# Patient Record
Sex: FEMALE | Race: WHITE | Employment: OTHER | URBAN - METROPOLITAN AREA
[De-identification: names, ages, dates, MRNs, and addresses within clinical notes are randomized per-mention and may not be internally consistent; named-entity substitution may affect disease eponyms.]

---

## 2017-01-16 ENCOUNTER — HOSPITAL ENCOUNTER (OUTPATIENT)
Dept: MAMMOGRAPHY | Age: 62
Discharge: HOME | End: 2017-01-16
Attending: OBSTETRICS & GYNECOLOGY
Payer: COMMERCIAL

## 2017-01-16 DIAGNOSIS — Z12.31 VISIT FOR SCREENING MAMMOGRAM: ICD-10-CM

## 2017-01-16 PROCEDURE — 77067 SCR MAMMO BI INCL CAD: CPT

## 2017-03-06 ENCOUNTER — HOSPITAL ENCOUNTER (OUTPATIENT)
Dept: NON INVASIVE DIAGNOSTICS | Age: 62
Discharge: HOME | End: 2017-03-06
Payer: COMMERCIAL

## 2017-03-06 DIAGNOSIS — I34.89: ICD-10-CM

## 2017-03-06 PROCEDURE — 93306 TTE W/DOPPLER COMPLETE: CPT

## 2018-01-22 ENCOUNTER — HOSPITAL ENCOUNTER (OUTPATIENT)
Dept: MAMMOGRAPHY | Age: 63
Discharge: HOME | End: 2018-01-22
Attending: OBSTETRICS & GYNECOLOGY
Payer: COMMERCIAL

## 2018-01-22 DIAGNOSIS — Z12.31 VISIT FOR SCREENING MAMMOGRAM: ICD-10-CM

## 2018-01-22 PROCEDURE — 77067 SCR MAMMO BI INCL CAD: CPT

## 2018-10-02 ENCOUNTER — HOSPITAL ENCOUNTER (OUTPATIENT)
Dept: GENERAL RADIOLOGY | Age: 63
Discharge: HOME | End: 2018-10-02
Payer: COMMERCIAL

## 2018-10-02 ENCOUNTER — OFFICE VISIT (OUTPATIENT)
Dept: INTERNAL MEDICINE CLINIC | Age: 63
End: 2018-10-02

## 2018-10-02 VITALS
HEART RATE: 82 BPM | RESPIRATION RATE: 18 BRPM | SYSTOLIC BLOOD PRESSURE: 118 MMHG | DIASTOLIC BLOOD PRESSURE: 88 MMHG | WEIGHT: 153.6 LBS | OXYGEN SATURATION: 97 % | HEIGHT: 68 IN | TEMPERATURE: 97.8 F | BODY MASS INDEX: 23.28 KG/M2

## 2018-10-02 DIAGNOSIS — Z00.00 ROUTINE GENERAL MEDICAL EXAMINATION AT A HEALTH CARE FACILITY: Primary | ICD-10-CM

## 2018-10-02 DIAGNOSIS — Z90.81 HISTORY OF SPLENECTOMY: ICD-10-CM

## 2018-10-02 DIAGNOSIS — R29.898 BILATERAL LEG WEAKNESS: ICD-10-CM

## 2018-10-02 DIAGNOSIS — M79.10 MYALGIA: ICD-10-CM

## 2018-10-02 PROCEDURE — 72100 X-RAY EXAM L-S SPINE 2/3 VWS: CPT

## 2018-10-02 NOTE — PROGRESS NOTES
Establish Care HPI: 
Julia Bobo is a 58y.o. year old female who is here to establish care. She  had her medical care: ADM She reports the following history and medical concerns:   
 
2 years since last visit. MVP- Dr. Opal Garcia follows her. See NP for echo every 2 years. occ PVC Thyroid Cyst- saw Dr. Lv Reyez. No more follow up necessary. S/P FNA Hx of anxiety- tried zoloft and lexapro- couldn't eat and stomach issues. Clonazepam caused sedation and fatigue. 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 Hx of ITP. Had flu shot last month. RN at Operating room Due for colonoscopy. She will reschedule. New Problem:  
 Venessa Medina- goes to gym 3-5 miles a day 5 days a week Never smoked. Right buttock and some left buttock. Hiked very well. Started a couple of months ago. Some weakness. Tightness and cramps in toes both sides right more than leg Some tightness in arm Brother just diagnosed cause with ALS When she walks, just feels tired. No back pain at all. Legs just feel tight and aching. Assessment and Plan 1. Routine general medical examination at a health care facility Separate to her current complaints. She has not had active issues and as recent as a few months ago, she hiked long distances without issues. Didn't need PCP for 2 years 
- CBC WITH AUTOMATED DIFF 
- TSH REFLEX TO T4 
- METABOLIC PANEL, COMPREHENSIVE 
- VITAMIN D, 25 HYDROXY 
- UA/M W/RFLX CULTURE, ROUTINE 
- MICROALBUMIN, UR, RAND W/ MICROALB/CREAT RATIO 2. Myalgia Not real pain but stiffness in back of thighs,  Circulation intact. Non smoker. Hx of RF positive but had negative rheumatological work up 
- CK - C REACTIVE PROTEIN, QT 
- TAMMY W/REFLEX 3. Bilateral leg weakness On exam, power is intact. Vibration intact. Reflexes normal and not hyperreflexive.   Sensory is reported by patient diminished on exam bilaterally to light touch and pin prick. She feels the sensation but not sharp. 
- XR SPINE LUMB 2 OR 3 V; Future - TAMMY W/REFLEX 4. History of splenectomy Up to date with pneumonia shot and meningitis vaccine. Visit Vitals  /88 (BP 1 Location: Right arm, BP Patient Position: Sitting)  Pulse 82  Temp 97.8 °F (36.6 °C) (Oral)  Resp 18  Ht 5' 7.5\" (1.715 m)  Wt 153 lb 9.6 oz (69.7 kg)  SpO2 97%  BMI 23.7 kg/m2 Historical Data Past Medical History:  
Diagnosis Date  Dysfunction of inner ear  History of splenectomy 10/3/2018  ITP (idiopathic thrombocytopenic purpura) Age 12  Multiple thyroid nodules Dr. Gus Sanchez  MVP (mitral valve prolapse) Dr. Sagar Truong  OA (osteoarthritis)  Osteopenia 7/19/16  Postmenopausal bleeding   
 neg eval by Dr. Kailyn Rowland  Rheumatoid factor positive 07/19/2016  
 false positive, negative eval by Dr. Mateus Tracy  Vitamin D insufficiency Past Surgical History:  
Procedure Laterality Date  HX COLONOSCOPY  12/7/07  
 normal, Dr. Jessica Paez  12/7/12 R thyroid cyst FNA  HX SPLENECTOMY  HX TONSILLECTOMY Outpatient Encounter Prescriptions as of 10/2/2018 Medication Sig Dispense Refill  Cholecalciferol, Vitamin D3, (VITAMIN D3) 2,000 unit cap capsule Take  by mouth daily.  VITAMIN B COMPLEX PO Take  by mouth.  multivitamin (ONE A DAY) tablet Take 1 Tab by mouth daily. 30 Tab 0  [DISCONTINUED] ibuprofen (ADVIL) 200 mg tablet Take  by mouth.  [DISCONTINUED] Estradiol-Levonorgestrel (CLIMARA PRO) 0.045-0.015 mg/24 hr 1 Patch by TransDERmal route Every Saturday. No facility-administered encounter medications on file as of 10/2/2018. Allergies Allergen Reactions  Zoloft [Sertraline] Other (comments) Decreased appetite  Benzodiazepines Other (comments) Clonzepam and Restoril  With sedation and fatigue  Lexapro [Escitalopram] Other (comments) Decreased appetite, tachycardia, diaphoresis and increased anxiety  Prozac [Fluoxetine] Other (comments) depression Social History Social History  Marital status:  Spouse name: N/A  
 Number of children: N/A  
 Years of education: N/A Occupational History  Not on file. Social History Main Topics  Smoking status: Never Smoker  Smokeless tobacco: Never Used  Alcohol use Yes Comment: occ  Drug use: No  
 Sexual activity: Yes  
  Partners: Male Other Topics Concern  Not on file Social History Narrative  
  
 
family history includes Diabetes in her father; Heart Disease in her father; Hypertension in her father; No Known Problems in her mother; Stroke in her father. Review of Systems Constitutional: Negative for fever and weight loss. HENT: Negative for hearing loss. Eyes: Negative for blurred vision. Respiratory: Negative for cough and shortness of breath. Cardiovascular: Negative for chest pain. Gastrointestinal: Negative for abdominal pain. Genitourinary: Negative for dysuria, frequency and urgency. Musculoskeletal: Positive for myalgias. Negative for back pain, falls, joint pain and neck pain. Skin: Negative for rash. Neurological: Negative for dizziness, focal weakness, weakness and headaches. Endo/Heme/Allergies: Negative for environmental allergies. Does not bruise/bleed easily. Psychiatric/Behavioral: Negative for depression. The patient does not have insomnia. Physical Exam  
Constitutional: She is oriented to person, place, and time. She appears well-nourished. No distress. HENT:  
Mouth/Throat: No oropharyngeal exudate. Eyes: Conjunctivae and EOM are normal. Pupils are equal, round, and reactive to light. Neck: Carotid bruit is not present. No thyromegaly present. Cardiovascular: Normal rate, regular rhythm and normal heart sounds. Pulmonary/Chest: Effort normal and breath sounds normal. No respiratory distress. She has no wheezes. Abdominal: Soft. Bowel sounds are normal. She exhibits no mass. There is no tenderness. Musculoskeletal: She exhibits no edema or tenderness. Lymphadenopathy:  
  She has no cervical adenopathy. Neurological: She is alert and oriented to person, place, and time. She displays no atrophy, no tremor and normal reflexes. A sensory deficit (leg exam shows decreased perception of pin prick bilateral legs.) is present. No cranial nerve deficit. She exhibits normal muscle tone. Coordination and gait normal.  
Skin: Skin is warm and dry. No rash noted. No erythema. Psychiatric: She has a normal mood and affect. Thought content normal.  
Nursing note and vitals reviewed. Ortho Exam  
 
 
Orders Placed This Encounter  XR SPINE LUMB 2 OR 3 V Standing Status:   Future Number of Occurrences:   1 Standing Expiration Date:   11/2/2019 Order Specific Question:   Reason for Exam  
  Answer:   bilateral leg weakness  CBC WITH AUTOMATED DIFF  
 TSH REFLEX TO T4  
 METABOLIC PANEL, COMPREHENSIVE  VITAMIN D, 25 HYDROXY  
 UA/M W/RFLX CULTURE, ROUTINE  
 MICROALBUMIN, UR, RAND W/ MICROALB/CREAT RATIO  CK  C REACTIVE PROTEIN, QT  
 TAMMY W/REFLEX I have reviewed the patient's medical history in detail and updated the computerized patient record. We had a prolonged discussion about these complex clinical issues and went over the various important aspects to consider. All questions were answered. Advised her to call back or return to office if symptoms do not improve, change in nature, or persist. 
 
She was given an after visit summary or informed of [employer] Access which includes patient instructions, diagnoses, current medications, & vitals. She expressed understanding with the diagnosis and plan.

## 2018-10-02 NOTE — MR AVS SNAPSHOT
[address] [facility], [address] [place] [address] 
[phone] Patient: Scott Shipley MRN: [id number] FVD:[birth year] Visit Information Date & Time Provider Department Dept. Phone Encounter #  
 10/2/2018  1:45 PM MD [facility]ash [facility] [phone] [id number] Follow-up Instructions Return in about 4 weeks (around 10/30/2018). Upcoming Health Maintenance Date Due Shingrix Vaccine Age 50> (1 of 2) 7/31/2004 COLONOSCOPY 12/7/2017 PAP AKA CERVICAL CYTOLOGY 1/1/2019 Influenza Age 5 to Adult 3/31/2019* BREAST CANCER SCRN MAMMOGRAM 1/22/2020 DTaP/Tdap/Td series (2 - Td) 2/14/2023 *Topic was postponed. The date shown is not the original due date. Allergies as of 10/2/2018  Review Complete On: 10/2/2018 By: Magy Arnold LPN Severity Noted Reaction Type Reactions Zoloft [Sertraline] Medium 05/11/2016   Side Effect Other (comments) Decreased appetite Benzodiazepines  02/04/2015    Other (comments) Clonzepam and Restoril  With sedation and fatigue Lexapro [Escitalopram]  04/18/2016   Side Effect Other (comments) Decreased appetite, tachycardia, diaphoresis and increased anxiety Prozac [Fluoxetine]  02/04/2015    Other (comments) depression Current Immunizations  Reviewed on 4/12/2016 Name Date Influenza High Dose Vaccine PF 10/1/2016 Influenza Vaccine 9/1/2014, 9/20/2013 Meningococcal ACWY Vaccine 11/13/2014 Pneumococcal Vaccine (Unspecified Type) 1/14/1999 Tdap 2/14/2013 Zoster Vaccine, Live 8/11/2014 Not reviewed this visit You Were Diagnosed With   
  
 Codes Comments Routine general medical examination at a health care facility    -  Primary ICD-10-CM: Z00.00 ICD-9-CM: V70.0 Myalgia     ICD-10-CM: M79.10 ICD-9-CM: 729.1 Bilateral leg weakness     ICD-10-CM: R29.898 ICD-9-CM: 729.89 Vitals BP Pulse Temp Resp Height(growth percentile) Weight(growth percentile) 118/88 (BP 1 Location: Right arm, BP Patient Position: Sitting) 82 97.8 °F (36.6 °C) (Oral) 18 5' 7.5\" (1.715 m) 153 lb 9.6 oz (69.7 kg) SpO2 BMI OB Status Smoking Status 97% 23.7 kg/m2 Menopause Never Smoker Vitals History BMI and BSA Data Body Mass Index Body Surface Area  
 23.7 kg/m 2 1.82 m 2 Preferred Pharmacy Pharmacy Name Phone [facility] [phone] Your Updated Medication List  
  
   
This list is accurate as of 10/2/18  2:48 PM.  Always use your most recent med list.  
  
  
  
  
 multivitamin tablet Commonly known as:  ONE A DAY Take 1 Tab by mouth daily. VITAMIN B COMPLEX PO Take  by mouth. VITAMIN D3 2,000 unit Cap capsule Generic drug:  Cholecalciferol (Vitamin D3) Take  by mouth daily. We Performed the Following TAMMY W/REFLEX [72704 CPT(R)] C REACTIVE PROTEIN, QT [48749 CPT(R)] CBC WITH AUTOMATED DIFF [04673 CPT(R)] CK [id number] CPT(R)] METABOLIC PANEL, COMPREHENSIVE [80201 CPT(R)] MICROALBUMIN, UR, RAND W/ MICROALB/CREAT RATIO [id number] CPT(R)] TSH REFLEX TO T4 [47192 CPT(R)] UA/M W/RFLX CULTURE, ROUTINE [[id number] Custom] VITAMIN D, 25 HYDROXY [id number] CPT(R)] Follow-up Instructions Return in about 4 weeks (around 10/30/2018). To-Do List   
 10/02/2018 Imaging:  XR SPINE LUMB 2 OR 3 V Introducing \[facility] [place] [facility]\"" & HEALTH SERVICES! Dear Aileen Roberto: Thank you for requesting a [employer] account. Our records indicate that you already have an active [employer] account. You can access your account anytime at [email or web address] Did you know that you can access your hospital and ER discharge instructions at any time in [employer]? You can also review all of your test results from your hospital stay or ER visit. Additional Information If you have questions, please visit the Frequently Asked Questions section of the [employer] website at [email or web address] Remember, Everloop is NOT to be used for urgent needs. For medical emergencies, dial 911. Now available from your iPhone and Android! Please provide this summary of care documentation to your next provider. Your primary care clinician is listed as Sherry Joseph. If you have any questions after today's visit, please call [phone].

## 2018-10-03 PROBLEM — Z90.81 HISTORY OF SPLENECTOMY: Status: ACTIVE | Noted: 2018-10-03

## 2018-10-03 NOTE — PROGRESS NOTES
There was some scoliosis but also some arthritis. The schmorl's node could indicate some disc herniation. A MRI would be helpful to evaluate how much it would be causing your symptoms.   Let's wait until we get your blood tests back  Message sent to patient via Cokonnect:  October 2, 2018

## 2018-10-04 ENCOUNTER — PATIENT MESSAGE (OUTPATIENT)
Dept: INTERNAL MEDICINE CLINIC | Age: 63
End: 2018-10-04

## 2018-10-04 DIAGNOSIS — R29.898 LEG WEAKNESS, BILATERAL: Primary | ICD-10-CM

## 2018-10-04 LAB
25(OH)D3+25(OH)D2 SERPL-MCNC: 20.3 NG/ML (ref 30–100)
ALBUMIN SERPL-MCNC: 4.5 G/DL (ref 3.6–4.8)
ALBUMIN/CREAT UR: 6.7 MG/G CREAT (ref 0–30)
ALBUMIN/GLOB SERPL: 1.7 {RATIO} (ref 1.2–2.2)
ALP SERPL-CCNC: 64 IU/L (ref 39–117)
ALT SERPL-CCNC: 31 IU/L (ref 0–32)
APPEARANCE UR: CLEAR
AST SERPL-CCNC: 37 IU/L (ref 0–40)
BACTERIA #/AREA URNS HPF: ABNORMAL /[HPF]
BASOPHILS # BLD AUTO: 0.1 X10E3/UL (ref 0–0.2)
BASOPHILS NFR BLD AUTO: 1 %
BILIRUB SERPL-MCNC: 0.4 MG/DL (ref 0–1.2)
BILIRUB UR QL STRIP: NEGATIVE
BUN SERPL-MCNC: 19 MG/DL (ref 8–27)
BUN/CREAT SERPL: 31 (ref 12–28)
CALCIUM SERPL-MCNC: 9.4 MG/DL (ref 8.7–10.3)
CASTS URNS QL MICRO: ABNORMAL /LPF
CHLORIDE SERPL-SCNC: 101 MMOL/L (ref 96–106)
CK SERPL-CCNC: 94 U/L (ref 24–173)
CO2 SERPL-SCNC: 21 MMOL/L (ref 20–29)
COLOR UR: YELLOW
CREAT SERPL-MCNC: 0.62 MG/DL (ref 0.57–1)
CREAT UR-MCNC: 151.4 MG/DL
CRP SERPL-MCNC: 1.2 MG/L (ref 0–4.9)
CRYSTALS URNS MICRO: ABNORMAL
EOSINOPHIL # BLD AUTO: 0.1 X10E3/UL (ref 0–0.4)
EOSINOPHIL NFR BLD AUTO: 1 %
EPI CELLS #/AREA URNS HPF: >10 /HPF
ERYTHROCYTE [DISTWIDTH] IN BLOOD BY AUTOMATED COUNT: 12.5 % (ref 12.3–15.4)
GLOBULIN SER CALC-MCNC: 2.6 G/DL (ref 1.5–4.5)
GLUCOSE SERPL-MCNC: 81 MG/DL (ref 65–99)
GLUCOSE UR QL: NEGATIVE
HCT VFR BLD AUTO: 40.1 % (ref 34–46.6)
HGB BLD-MCNC: 13.8 G/DL (ref 11.1–15.9)
HGB UR QL STRIP: NEGATIVE
IMM GRANULOCYTES # BLD: 0 X10E3/UL (ref 0–0.1)
IMM GRANULOCYTES NFR BLD: 0 %
KETONES UR QL STRIP: NEGATIVE
LEUKOCYTE ESTERASE UR QL STRIP: NEGATIVE
LYMPHOCYTES # BLD AUTO: 2.4 X10E3/UL (ref 0.7–3.1)
LYMPHOCYTES NFR BLD AUTO: 35 %
MCH RBC QN AUTO: 33.9 PG (ref 26.6–33)
MCHC RBC AUTO-ENTMCNC: 34.4 G/DL (ref 31.5–35.7)
MCV RBC AUTO: 99 FL (ref 79–97)
MICRO URNS: ABNORMAL
MICRO URNS: ABNORMAL
MICROALBUMIN UR-MCNC: 10.1 UG/ML
MONOCYTES # BLD AUTO: 0.8 X10E3/UL (ref 0.1–0.9)
MONOCYTES NFR BLD AUTO: 12 %
MUCOUS THREADS URNS QL MICRO: PRESENT
NEUTROPHILS # BLD AUTO: 3.6 X10E3/UL (ref 1.4–7)
NEUTROPHILS NFR BLD AUTO: 51 %
NITRITE UR QL STRIP: NEGATIVE
PH UR STRIP: 5.5 [PH] (ref 5–7.5)
PLATELET # BLD AUTO: 325 X10E3/UL (ref 150–379)
POTASSIUM SERPL-SCNC: 4.5 MMOL/L (ref 3.5–5.2)
PROT SERPL-MCNC: 7.1 G/DL (ref 6–8.5)
PROT UR QL STRIP: NEGATIVE
RBC # BLD AUTO: 4.07 X10E6/UL (ref 3.77–5.28)
RBC #/AREA URNS HPF: ABNORMAL /HPF
SODIUM SERPL-SCNC: 139 MMOL/L (ref 134–144)
SP GR UR: >=1.03 (ref 1–1.03)
TSH SERPL DL<=0.005 MIU/L-ACNC: 0.86 UIU/ML (ref 0.45–4.5)
UNIDENT CRYS URNS QL MICRO: PRESENT
URINALYSIS REFLEX, 377202: ABNORMAL
UROBILINOGEN UR STRIP-MCNC: 0.2 MG/DL (ref 0.2–1)
WBC # BLD AUTO: 7 X10E3/UL (ref 3.4–10.8)
WBC #/AREA URNS HPF: ABNORMAL /HPF

## 2018-10-04 NOTE — TELEPHONE ENCOUNTER
From: Soila Ward MD  To: Hien Newell  Sent: 10/4/2018 12:01 PM EDT  Subject: test    I am still waiting for the TAMMY but the rest of your labs were normal. Your Vit D was low and take 2000 International units once of day of vitamin D3. The next step would be to see a neurologist to get possible an EMG study. Is that ok with you?

## 2018-10-04 NOTE — PROGRESS NOTES
I am still waiting for the TAMMY but the rest of your labs were normal.  Your Vit D was low and take 2000 International units once of day of vitamin D3. The next step would be to see a neurologist to get possible an EMG study. Is that ok with you? Message sent to patient via GradeStack: 
October 4, 2018

## 2018-10-08 ENCOUNTER — OFFICE VISIT (OUTPATIENT)
Dept: NEUROLOGY | Age: 63
End: 2018-10-08

## 2018-10-08 VITALS
DIASTOLIC BLOOD PRESSURE: 78 MMHG | SYSTOLIC BLOOD PRESSURE: 138 MMHG | BODY MASS INDEX: 23.46 KG/M2 | OXYGEN SATURATION: 98 % | HEART RATE: 83 BPM | WEIGHT: 152 LBS

## 2018-10-08 DIAGNOSIS — M62.89 MUSCLE TIGHTNESS: Primary | ICD-10-CM

## 2018-10-08 NOTE — MR AVS SNAPSHOT
[address] [place] [address] [address] [phone] Patient: Bora Guo MRN: [id number] UW:0[birth year]58 Visit Information Date & Time Provider Department Dept. Phone Encounter #  
 10/8/2018  1:00 PM Tawanna Guajardo MD [facility] [phone] [id number] Follow-up Instructions Return for review of results. Your Appointments 11/5/2018  3:30 PM  
[facility] [address] [facility]) Appt Note: 4 week f/u  
 [address] , [address] [place] [address]  
[address], [facility] [address] [facility] [address] Upcoming Health Maintenance Date Due  
 MenB Meningococcal topic (1 of 2 - Bexsero 2-Dose Series) 7/31/1964 Pneumococcal 19-64 Highest Risk (2 of 3 - PCV13) 1/14/2000 Shingrix Vaccine Age 50> (1 of 2) 7/31/2004 COLONOSCOPY 12/7/2017 PAP AKA CERVICAL CYTOLOGY 1/1/2019 Influenza Age 5 to Adult 3/31/2019* BREAST CANCER SCRN MAMMOGRAM 1/22/2020 DTaP/Tdap/Td series (2 - Td) 2/14/2023 *Topic was postponed. The date shown is not the original due date. Allergies as of 10/8/2018  Review Complete On: 10/8/2018 By: Tawanna Guajardo MD  
  
 Severity Noted Reaction Type Reactions Zoloft [Sertraline] Medium 05/11/2016   Side Effect Other (comments) Decreased appetite Benzodiazepines  02/04/2015    Other (comments) Clonzepam and Restoril  With sedation and fatigue Lexapro [Escitalopram]  04/18/2016   Side Effect Other (comments) Decreased appetite, tachycardia, diaphoresis and increased anxiety Prozac [Fluoxetine]  02/04/2015    Other (comments) depression Current Immunizations  Reviewed on 4/12/2016 Name Date Influenza High Dose Vaccine PF 10/1/2016 Influenza Vaccine 9/1/2014, 9/20/2013 Meningococcal ACWY Vaccine 11/13/2014 Pneumococcal Vaccine (Unspecified Type) 1/14/1999 Tdap 2/14/2013 Zoster Vaccine, Live 8/11/2014 Not reviewed this visit You Were Diagnosed With   
  
 Codes Comments Muscle tightness    -  Primary ICD-10-CM: M62.89 ICD-9-CM: 728.9 Vitals BP Pulse Weight(growth percentile) SpO2 BMI OB Status 138/78 83 152 lb (68.9 kg) 98% 23.46 kg/m2 Menopause Smoking Status Never Smoker BMI and BSA Data Body Mass Index Body Surface Area  
 23.46 kg/m 2 1.81 m 2 Preferred Pharmacy Pharmacy Name Phone [facility] [phone] Your Updated Medication List  
  
   
This list is accurate as of 10/8/18  1:41 PM.  Always use your most recent med list.  
  
  
  
  
 multivitamin tablet Commonly known as:  ONE A DAY Take 1 Tab by mouth daily. VITAMIN B COMPLEX PO Take  by mouth. VITAMIN D3 2,000 unit Cap capsule Generic drug:  Cholecalciferol (Vitamin D3) Take  by mouth daily. We Performed the Following REFERRAL TO PHYSICAL THERAPY [WYI96 Custom] Comments:  
 Evaluate and treat for bilateral leg tightness and weakness, R worse than L with lower back pain; possible spinal stenosis; Follow-up Instructions Return for review of results. To-Do List   
 10/08/2018 Imaging:  MRI LUMB SPINE WO CONT Referral Information Referral ID Referred By Referred To  
  
 [id number] ZOË Herrera Not Available Visits Status Start Date End Date 1 New Request 10/8/18 10/8/19 If your referral has a status of pending review or denied, additional information will be sent to support the outcome of this decision. Patient Instructions PRESCRIPTION REFILL POLICY [facility] Statement to Patients April 1, 2014 In an effort to ensure the large volume of patient prescription refills is processed in the most efficient and expeditious manner, we are asking our patients to assist us by calling your Pharmacy for all prescription refills, this will include also your  Mail Order Pharmacy. The pharmacy will contact our office electronically to continue the refill process. Please do not wait until the last minute to call your pharmacy. We need at least 48 hours (2days) to fill prescriptions. We also encourage you to call your pharmacy before going to  your prescription to make sure it is ready. With regard to controlled substance prescription refill requests (narcotic refills) that need to be picked up at our office, we ask your cooperation by providing us with at least 72 hours (3days) notice that you will need a refill. We will not refill narcotic prescription refill requests after 4:00pm on any weekday, Monday through Thursday, or after 2:00pm on Fridays, or on the weekends. We encourage everyone to explore another way of getting your prescription refill request processed using [employer], our patient web portal through our electronic medical record system. [employer] is an efficient and effective way to communicate your medication request directly to the office and  downloadable as an kari on your smart phone . [employer] also features a review functionality that allows you to view your medication list as well as leave messages for your physician. Are you ready to get connected? If so please review the attatched instructions or speak to any of our staff to get you set up right away! Thank you so much for your cooperation. Should you have any questions please contact our Practice Administrator. The Physicians and Staff,  [facility] Introducing [facility] SERVICES! Dear Maegan Pan: Thank you for requesting a [employer] account. Our records indicate that you already have an active [employer] account.   You can access your account anytime at [email or web address] Did you know that you can access your hospital and ER discharge instructions at any time in [employer]? You can also review all of your test results from your hospital stay or ER visit. Additional Information If you have questions, please visit the Frequently Asked Questions section of the [employer] website at [email or web address] Remember, [employer] is NOT to be used for urgent needs. For medical emergencies, dial 911. Now available from your iPhone and Android! Please provide this summary of care documentation to your next provider. Your primary care clinician is listed as Richa Piedra. If you have any questions after today's visit, please call [phone].

## 2018-10-08 NOTE — PATIENT INSTRUCTIONS
[address] [facility]   Statement to Patients  April 1, 2014      In an effort to ensure the large volume of patient prescription refills is processed in the most efficient and expeditious manner, we are asking our patients to assist us by calling your Pharmacy for all prescription refills, this will include also your  Mail Order Pharmacy. The pharmacy will contact our office electronically to continue the refill process. Please do not wait until the last minute to call your pharmacy. We need at least 48 hours (2days) to fill prescriptions. We also encourage you to call your pharmacy before going to  your prescription to make sure it is ready. With regard to controlled substance prescription refill requests (narcotic refills) that need to be picked up at our office, we ask your cooperation by providing us with at least 72 hours (3days) notice that you will need a refill. We will not refill narcotic prescription refill requests after 4:00pm on any weekday, Monday through Thursday, or after 2:00pm on Fridays, or on the weekends. We encourage everyone to explore another way of getting your prescription refill request processed using [employer], our patient web portal through our electronic medical record system. [employer] is an efficient and effective way to communicate your medication request directly to the office and  downloadable as an kari on your smart phone . [employer] also features a review functionality that allows you to view your medication list as well as leave messages for your physician. Are you ready to get connected? If so please review the attatched instructions or speak to any of our staff to get you set up right away! Thank you so much for your cooperation. Should you have any questions please contact our Practice Administrator.     The Physicians and Staff,  [facility]

## 2018-10-08 NOTE — LETTER
10/8/2018 1:54 PM 
 
Patient:  Marielle Berry YOB: 1955 Date of Visit: 10/8/2018 Dear Dana Davila MD 
[address] [place] [address] [address] [facility] 7 [id number] VIA In Basket 
 : Thank you for referring Ms. Derick Chang to me for evaluation/treatment. Below are the relevant portions of my assessment and plan of care. If you have questions, please do not hesitate to call me. I look forward to following Ms. Fernandez along with you. Sincerely, Lorin Singleton MD

## 2018-10-08 NOTE — PROGRESS NOTES
NEUROLOGY NEW PATIENT OFFICE CONSULTATION      10/8/2018    RE: Jaylin Jaimes         1955      REFERRED BY:  Merry Anderson MD        CHIEF COMPLAINT:  This is Jaylin Jaimes is a 58 y.o. female right handed [facility] who had concerns including Leg Pain.     HPI:     For the past 1 months, patient noted intermittent muscle tightness and dull aching of both lower extremities that starts on the bip and goes to the posterior leg, R worse than L,     (+) weakness of legs  (-) lower back pain  (+) dull aching of lower back pain  (-) incontinence    ROS   (-) fever  (-) rash  All other systems reviewed and are negative    Past Medical Hx  Past Medical History:   Diagnosis Date    Dysfunction of inner ear     History of splenectomy 10/3/2018    ITP (idiopathic thrombocytopenic purpura)     Age 12    Multiple thyroid nodules     Dr. Ester Vizcarra MVP (mitral valve prolapse)     Dr. Sarah Couch OA (osteoarthritis)     Osteopenia 7/19/16    Postmenopausal bleeding     neg eval by Dr. Caron Rosenbaum Rheumatoid factor positive 07/19/2016    false positive, negative eval by Dr. Jim Dick Vitamin D insufficiency        Social Hx  Social History     Social History    Marital status:      Spouse name: N/A    Number of children: N/A    Years of education: N/A     Social History Main Topics    Smoking status: Never Smoker    Smokeless tobacco: Never Used    Alcohol use Yes      Comment: occ    Drug use: No    Sexual activity: Yes     Partners: Male     Other Topics Concern    None     Social History Narrative       Family Hx  Family History   Problem Relation Age of Onset    Diabetes Father     Heart Disease Father      CABG 63's    Hypertension Father     Stroke Father     No Known Problems Mother    Brother ALS    ALLERGIES  Allergies   Allergen Reactions    Zoloft [Sertraline] Other (comments)     Decreased appetite    Benzodiazepines Other (comments)      Clonzepam and Restoril  With sedation and fatigue    Lexapro [Escitalopram] Other (comments)     Decreased appetite, tachycardia, diaphoresis and increased anxiety    Prozac [Fluoxetine] Other (comments)     depression       CURRENT MEDS  Current Outpatient Prescriptions   Medication Sig Dispense Refill    Cholecalciferol, Vitamin D3, (VITAMIN D3) 2,000 unit cap capsule Take  by mouth daily.  VITAMIN B COMPLEX PO Take  by mouth.  multivitamin (ONE A DAY) tablet Take 1 Tab by mouth daily. 30 Tab 0           PREVIOUS WORKUP: (reviewed)  IMAGING:    CT Results (recent):    Results from Hospital Encounter encounter on 12/31/12   CT SOFT TISSUE NECK W CONT   Narrative **Final Report**      ICD Codes / Adm. Diagnosis: 246.2  784.2 / Cyst of thyroid  Swelling, mass,   or lump in hea  Examination:  CT SOFT TISSUE NECK W CON  - [id number] - Dec 31 2012  3:39PM  Accession No:  [id number]  Reason:  right neck mass,hx of thyroid,cyst,painful      REPORT:  INDICATION:    Exam: CT of the neck is performed with 2.5 mm collimation after the   intravenous administration of 100 cc of nonionic IV Optiray-320. A metallic   marker was placed over the right anterior neck in the region of palpable   abnormality. Sagittal and coronal reformatted images were also obtained. There is no prior study for direct comparison. FINDINGS: There is a 2.1 CM by 2.4 CM x 3.6 cm complex cyst within the right   thyroid lobe. There is a 7 mm nodule within the superior pole of the left   thyroid lobe and a 3.1 CM nodule within the lower pole of the left thyroid   lobe. There is no cervical lymphadenopathy. Subcentimeter bilateral   submandibular and jugular lymph nodes are noted. Visualized paranasal   sinuses are clear. Mastoid air cells are well pneumatized. The airway is   midline and patent. The visualized upper lungs are clear.   The submandibular   and parotid glands are normal.       IMPRESSION: 2.1 CM by 2.4 CM x 3.6 cm complex cyst in the right thyroid   lobe, correlating with patient's palpable abnormality. Recommend   correlation with fine needle aspiration results, dated December 7, 2012           Signing/Reading Doctor: MARSHALL Mcdonald ([id number])    Approved: MARSHALL Mcdonald ([id number])  Dec 31 2012  4:17PM                                      MRI Results (recent):    Results from Hospital Encounter encounter on 06/20/16   MRI IAC W WO CONT   Narrative **Final Report**      ICD Codes / Adm. Diagnosis: 386.2  389.10 / Vertigo of central origin    Sensorineural hearing loss, un  Examination:  MR Aries Danielle CON  - [id number] - Jun 20 2016  7:04PM  Accession No:  [id number]  Reason:  dizziness      REPORT:  INDICATION: dizziness , imbalance, chronic over last several years, without   antecedent trauma. Vertigo of central origin, bilateral letter H 81.43,   sensorineural hearing loss, bilateral H 90.3, R 45.86.    COMPARISON: MRI August 25, 2005    EXAM: History thin section axial T2-weighted, axial T1-weighted and post IV   contrast enhanced axial and coronal fat-suppressed T1-weighted MR images of   the posterior fossa. Sagittal T1-weighted spin-echo, axial FLAIR and   T2-weighted fast spin-echo, axial diffusion weighted echo planar, and post   IV contrast-enhanced axial T1-weighted spin-echo MR images of the whole   brain are obtained. A total of 7 cc intravenous Gadavist was administered   for the study. FINDINGS: There is no mass or enhancement abnormality of the internal   auditory canals or cerebellopontine angles. No posterior fossa mass or   signal abnormality is shown. Size and contour of the cerebellum remains   normal with an unchanged small serpiginous structure of the inferior right   cerebellum consistent with an incidental venous angioma. Mild left   anterolateral impression rightward deviation of the medulla is again shown   related to a tortuous dominant left vertebral artery.     Numerous tiny nonspecific foci of intra-axial signal alteration are noted in   the cerebrum bilaterally, more numerous in the interval. Previously, a few   foci were shown. These foci are present in the periventricular and   subcortical regions as well as centrum semiovale. There remains no   intra-axial or extra-axial enhancement abnormality or mass. The ventricles   and cortical sulci remain normal in size and contour. The vascular flow voids at the base the brain remain normal in conspicuity. Sella, optic chiasm, orbits and paranasal sinuses remain normal.       IMPRESSION:   1. No mass or enhancement abnormality of posterior fossa. 2. Increased number of tiny supratentorial intra-axial foci of white matter   signal alteration, of nonspecific nature. Signing/Reading Doctor: Leda Shah ([id number])    Approved: BRODY Shah ([id number])  Jun 21 2016  8:54AM                                    IR Results (recent):  No results found for this or any previous visit. VAS/US Results (recent):  No results found for this or any previous visit. LABS (reviewed)  Results for orders placed or performed in visit on 10/02/18   CBC WITH AUTOMATED DIFF   Result Value Ref Range    WBC 7.0 3.4 - 10.8 x10E3/uL    RBC 4.07 3.77 - 5.28 x10E6/uL    HGB 13.8 11.1 - 15.9 g/dL    HCT 40.1 34.0 - 46.6 %    MCV 99 (H) 79 - 97 fL    MCH 33.9 (H) 26.6 - 33.0 pg    MCHC 34.4 31.5 - 35.7 g/dL    RDW 12.5 12.3 - 15.4 %    PLATELET 719 680 - 901 x10E3/uL    NEUTROPHILS 51 Not Estab. %    Lymphocytes 35 Not Estab. %    MONOCYTES 12 Not Estab. %    EOSINOPHILS 1 Not Estab. %    BASOPHILS 1 Not Estab. %    ABS. NEUTROPHILS 3.6 1.4 - 7.0 x10E3/uL    Abs Lymphocytes 2.4 0.7 - 3.1 x10E3/uL    ABS. MONOCYTES 0.8 0.1 - 0.9 x10E3/uL    ABS. EOSINOPHILS 0.1 0.0 - 0.4 x10E3/uL    ABS. BASOPHILS 0.1 0.0 - 0.2 x10E3/uL    IMMATURE GRANULOCYTES 0 Not Estab. %    ABS. IMM.  GRANS. 0.0 0.0 - 0.1 x10E3/uL   TSH REFLEX TO T4   Result Value Ref Range    TSH 0.864 0.450 - 7.197 uIU/mL   METABOLIC PANEL, COMPREHENSIVE Result Value Ref Range    Glucose 81 65 - 99 mg/dL    BUN 19 8 - 27 mg/dL    Creatinine 0.62 0.57 - 1.00 mg/dL    GFR est non-AA 96 >59 mL/min/1.73    GFR est  >59 mL/min/1.73    BUN/Creatinine ratio 31 (H) 12 - 28    Sodium 139 134 - 144 mmol/L    Potassium 4.5 3.5 - 5.2 mmol/L    Chloride 101 96 - 106 mmol/L    CO2 21 20 - 29 mmol/L    Calcium 9.4 8.7 - 10.3 mg/dL    Protein, total 7.1 6.0 - 8.5 g/dL    Albumin 4.5 3.6 - 4.8 g/dL    GLOBULIN, TOTAL 2.6 1.5 - 4.5 g/dL    A-G Ratio 1.7 1.2 - 2.2    Bilirubin, total 0.4 0.0 - 1.2 mg/dL    Alk. phosphatase 64 39 - 117 IU/L    AST (SGOT) 37 0 - 40 IU/L    ALT (SGPT) 31 0 - 32 IU/L   VITAMIN D, 25 HYDROXY   Result Value Ref Range    VITAMIN D, 25-HYDROXY 20.3 (L) 30.0 - 100.0 ng/mL   UA/M W/RFLX CULTURE, ROUTINE   Result Value Ref Range    Specific Gravity      >=1.030 (A) 1.005 - 1.030    pH (UA) 5.5 5.0 - 7.5    Color Yellow Yellow    Appearance Clear Clear    Leukocyte Esterase Negative Negative    Protein Negative Negative/Trace    Glucose Negative Negative    Ketone Negative Negative    Blood Negative Negative    Bilirubin Negative Negative    Urobilinogen 0.2 0.2 - 1.0 mg/dL    Nitrites Negative Negative    Microscopic Examination Comment     Microscopic exam See additional order     URINALYSIS REFLEX Comment    MICROALBUMIN, UR, RAND W/ MICROALB/CREAT RATIO   Result Value Ref Range    Creatinine, urine 151.4 Not Estab. mg/dL    Microalbumin, urine 10.1 Not Estab. ug/mL    Microalb/Creat ratio (ug/mg creat.) 6.7 0.0 - 30.0 mg/g creat   CK   Result Value Ref Range    Creatine Kinase,Total 94 24 - 173 U/L   C REACTIVE PROTEIN, QT   Result Value Ref Range    C-Reactive Protein, Qt 1.2 0.0 - 4.9 mg/L   MICROSCOPIC EXAMINATION   Result Value Ref Range    WBC 0-5 0 - 5 /hpf    RBC 0-2 0 - 2 /hpf    Epithelial cells >10 (A) 0 - 10 /hpf    Casts None seen None seen /lpf    Crystals Present (A) N/A    Crystal type Calcium Oxalate N/A    Mucus Present Not Estab. Bacteria Few None seen/Few       Physical Exam:     Visit Vitals    /78    Pulse 83    Wt 68.9 kg (152 lb)    SpO2 98%    BMI 23.46 kg/m2     General:  Alert, cooperative, no distress. Head:  Normocephalic, without obvious abnormality, atraumatic. Eyes:  Conjunctivae/corneas clear. Lungs:  Heart:   Non labored breathing  Regular rate and rhythm, no carotid bruits   Abdomen:   Soft, non-distended   Extremities: Extremities normal, atraumatic, no cyanosis or edema. Pulses: 2+ and symmetric all extremities. Skin: Skin color, texture, turgor normal. No rashes or lesions. Neurologic Exam     Gen: Attention normal             Language: naming, repetition, fluency normal             Memory: intact recent and remote memory  Cranial Nerves:  I: smell Not tested   II: visual fields Full to confrontation   II: pupils Equal, round, reactive to light   II: optic disc No papilledema   III,VII: ptosis none   III,IV,VI: extraocular muscles  Full ROM   V: mastication normal   V: facial light touch sensation  normal   VII: facial muscle function   symmetric   VIII: hearing symmetric   IX: soft palate elevation  normal   XI: trapezius strength  5/5   XI: sternocleidomastoid strength 5/5   XI: neck flexion strength  5/5   XII: tongue  midline     Motor: normal bulk and tone, no tremor              Strength: 5/5 all four extremities  (+) tenderness of the lower back pain   (-) fasciculations  (-) atrophy  Sensory: intact to LT, PP, vibration, and JPS  Reflexes: 2+ UE, 3 + knees and 2 + ankles throughout; Down going toes  Coordination: Good FTN and HTS  Gait: normal gait including tandem, able to stand from sitting, able to walk on toes and heels. Impression:     Livia Abebe is a 58 y.o. female who  has a past medical history of Dysfunction of inner ear;  History of splenectomy (10/3/2018); ITP (idiopathic thrombocytopenic purpura); Multiple thyroid nodules; MVP (mitral valve prolapse); OA (osteoarthritis); Osteopenia (7/19/16); Postmenopausal bleeding; Rheumatoid factor positive (07/19/2016); and Vitamin D insufficiency. who for the past 1 months, noted intermittent muscle tightness and dull aching of both lower extremities that starts on the buttock and goes to the posterior leg, R worse than L, associated with weakness of legs and dull aching of lower back pain. Consideration includes lumbar spinal stenosis. X-ray of the lumbar spine did show moderate spondylosis is at L2-3 and L3-4. Mild disc  space narrowing is present at L5-S1.     RECOMMENDATIONS  1. I had a long discussion with patient. Discussed diagnosis, prognosis, pathophysiology and available treatment. Reviewed test results. All questions were answered. 2. Will do MRI lumbar spine to clarify X-ray findings. Patient to call for results  3. Physical therapy referral  4. Patient is stress with brother recently diagnosed with ALS and wants to make sure that she does not have this. Discussed doing EMG/NCS of both LE, but patient wants to get the MRI lumbar spine and see how she responds with physical therapy first which is reasonable. Follow-up Disposition:  Return for review of results. Patient lives near [facility] and wants to follow up with our [facility] neurology group if physical therapy does not help.       Thank you for the consultation      Tawanna Guajardo MD  Diplomate, American Board of Psychiatry and Neurology  Diplomate, Neuromuscular Medicine  Diplomate, American Board of Electrodiagnostic Medicine        CC: Emilia Best MD  Fax: [phone]

## 2018-10-16 ENCOUNTER — HOSPITAL ENCOUNTER (OUTPATIENT)
Dept: MRI IMAGING | Age: 63
Discharge: HOME | End: 2018-10-16
Attending: PSYCHIATRY & NEUROLOGY
Payer: COMMERCIAL

## 2018-10-16 DIAGNOSIS — M62.89 MUSCLE TIGHTNESS: ICD-10-CM

## 2018-10-16 PROCEDURE — 72148 MRI LUMBAR SPINE W/O DYE: CPT

## 2018-10-17 ENCOUNTER — TELEPHONE (OUTPATIENT)
Dept: NEUROLOGY | Age: 63
End: 2018-10-17

## 2018-10-17 NOTE — TELEPHONE ENCOUNTER
TC- informed patient MRI lumbar spine did show some degenerative disc disease, but nothing that would require surgery. Patient verbalized understanding.

## 2018-10-19 ENCOUNTER — PATIENT MESSAGE (OUTPATIENT)
Dept: NEUROLOGY | Age: 63
End: 2018-10-19

## 2018-10-30 ENCOUNTER — HOSPITAL ENCOUNTER (OUTPATIENT)
Dept: PHYSICAL THERAPY | Age: 63
Discharge: HOME | End: 2018-10-30
Payer: COMMERCIAL

## 2018-10-30 DIAGNOSIS — M62.89 MUSCLE TIGHTNESS: ICD-10-CM

## 2018-10-30 PROCEDURE — 97161 PT EVAL LOW COMPLEX 20 MIN: CPT | Performed by: PHYSICAL THERAPIST

## 2018-10-30 PROCEDURE — 97110 THERAPEUTIC EXERCISES: CPT | Performed by: PHYSICAL THERAPIST

## 2018-10-30 NOTE — PROGRESS NOTES
PT INITIAL EVALUATION NOTE - [facility] 2-15    Patient Name: Hien Newell  Date:10/30/2018  : 1955  [x]  Patient  Verified  Payor: Tameka Saleh / Plan: Brigette Query / Product Type: PPO /    In time:4:00  Out time:440P  Total Treatment Time (min): 25  Total Timed Codes (min): 25  Visit #: 1     Treatment Area: Muscle tightness [M62.89]    SUBJECTIVE  Pain Level (0-10 scale): 2 current, 5-6 at worst, 2 at best   Any medication changes, allergies to medications, adverse drug reactions, diagnosis change, or new procedure performed?: [] No    [x] Yes (see summary sheet for update)  Subjective:    Patient is referred to PT with a chief complaint of stiffness in low back, R LE pain and numbness. Patient reports a 6 month history of numbness in her R toes with prolonged standing. In the middle of September she began to experience B buttocks pain and posterior thigh pain R > L. She saw Dr. Apollo Delvalle who referred her to Neurologist. She has had x-ray and MRI of lumbar spine and now is referred to PT. She has a history of lower back pain 15 years or more which she has managed with Advil, ice and chiropractor in past. Her brother was recently diagnosed with ALS and her in laws were recently diagnosed with dementia which has caused increased stress.    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   Pt Goals: \"get rid of sensations in legs so I feel like walking\"   Barriers: none  Motivation: good    OBJECTIVE/EXAMINATION  Observation: decreased lumbar spine segmental movement with lumbar AROM testing, R IC elevated standing   Squat test: B femora adduction       ROM:     Lumbar ROM:   Flexion fingertips 5 inches from floor  Extension decreased 50% stiffness   R SB decreased 75%   L SB decrased 25%  R rotation WFL  L rotation WFL    Hip PROM: WFL pain free    Strength:     R Hip flexion 5/5  R Knee extension 5/5  R Knee flexion 5/5  R Ankle DF 5/5  R Great toe extension 5/5  R Ankle PF 5/5  R hip abduction 4-/5    L Hip flexion 5/5  L Knee extension 5/5  L Knee flexion 5/5  L Ankle DF 5/5  L Great toe extension 5/5  L Ankle PF 5/5  L hip abduction 4-/5     Palpation: TTP B gluteal muscles    Joint mobility: hypomobile pain free PA glides L1-5     Special tests: Corey + B, 90/90 + B       25 min Therapeutic Exercise:  [x] See flow sheet : Taught patient SKTC, LTR, PPT and bridging    Rationale: increase ROM and increase strength to improve the patients ability to stand and sit without pain              With   [x] TE   [] TA   [] neuro   [] other: Patient Education: [x] Review HEP    [] Progressed/Changed HEP based on:   [] positioning   [] body mechanics   [] transfers   [x] heat/ice application    [x] other: Continue walking program and kam, and patient to discuss stress with PCP      Other Objective/Functional Measures:nt    Pain Level (0-10 scale) post treatment: 2    ASSESSMENT/Changes in Function:     [x]  See Plan of [address], PT 10/30/2018  4:02 PM

## 2018-10-30 NOTE — PROGRESS NOTES
[facility]  [address], [place] [facility]  Phone: [phone]  Fax: [phone]    Plan of Care/Statement of Necessity for Physical Therapy Services  2-15    Patient name: Jaylin Jaimes  : 1955  Provider#: [id number]  Referral source: Shahriar Walters MD      Medical/Treatment Diagnosis: Muscle tightness [M62.89]     Prior Hospitalization: see medical history     Comorbidities: none  Prior Level of Function: intermittent low back pain 15 years  Medications: Verified on Patient Summary List    Start of Care: 10/30/2018       Onset Date: 10/08/18       The Plan of Care and following information is based on the information from the initial evaluation. Assessment/ key information: The patient is a 58year old female who presents with decreased lumbar ROM and decreased LE flexibility with decreased core strength contributing to low back and LE pain limiting ability to perform functional activities such as prolonged sitting and standing. She would benefit from skilled PT to increase lumbar and LE ROM and increase core strength to decrease pain so she can return to prior level of function.      Evaluation Complexity History LOW Complexity : Zero comorbidities / personal factors that will impact the outcome / POC; Examination LOW Complexity : 1-2 Standardized tests and measures addressing body structure, function, activity limitation and / or participation in recreation  ;Presentation LOW Complexity : Stable, uncomplicated  ;Clinical Decision Making LOW Complexity : FOTO score of   Overall Complexity Rating: LOW     Problem List: pain affecting function, decrease ROM, decrease strength, decrease ADL/ functional abilitiies, decrease activity tolerance and decrease flexibility/ joint mobility   Treatment Plan may include any combination of the following: Therapeutic exercise, Therapeutic activities, Neuromuscular re-education, Physical agent/modality, Manual therapy, Patient education, Self Care training and Functional mobility training  Patient / Family readiness to learn indicated by: asking questions, trying to perform skills and interest  Persons(s) to be included in education: patient (P)  Barriers to Learning/Limitations: None  Patient Goal (s): \"get rid of sensations in legs so I feel like walking\"   Patient Self Reported Health Status: good  Rehabilitation Potential: good    Short Term Goals: To be accomplished in 2 weeks:    1. Patient will be I in HEP to promote self management of symptoms. 2. Patient will report pain level at worst as less than or equal to 4/10 so they can be performed without pain. Long Term Goals: To be accomplished in 4-6 weeks:    1. Patient will report pain level at worst as less than or equal to 2/10 so they can be performed without pain. 2. The patient will be able to drive greater than or equal to 45 min without increase in low back or LE pain. 3.The patient will be able to stand greater than or equal to 2 hours without increase in low back or LE pain. Frequency / Duration: Patient to be seen 1 times per week for 4-6 weeks. Patient/ Caregiver education and instruction: exercises    [x]  Plan of care has been reviewed with BUFFY Chavez, PT 10/30/2018 4:48 PM    ________________________________________________________________________    I certify that the above Therapy Services are being furnished while the patient is under my care. I agree with the treatment plan and certify that this therapy is necessary.     [de-identified] Signature:____________________  Date:____________Time: _________

## 2018-11-05 ENCOUNTER — OFFICE VISIT (OUTPATIENT)
Dept: INTERNAL MEDICINE CLINIC | Age: 63
End: 2018-11-05

## 2018-11-05 VITALS
HEIGHT: 68 IN | HEART RATE: 70 BPM | OXYGEN SATURATION: 95 % | TEMPERATURE: 98.8 F | RESPIRATION RATE: 18 BRPM | WEIGHT: 152.2 LBS | SYSTOLIC BLOOD PRESSURE: 128 MMHG | BODY MASS INDEX: 23.07 KG/M2 | DIASTOLIC BLOOD PRESSURE: 80 MMHG

## 2018-11-05 DIAGNOSIS — E04.1 THYROID CYST: ICD-10-CM

## 2018-11-05 DIAGNOSIS — G25.81 RESTLESS LEGS: Primary | ICD-10-CM

## 2018-11-05 RX ORDER — ROPINIROLE 0.25 MG/1
0.25 TABLET, FILM COATED ORAL
Qty: 30 TAB | Refills: 2 | Status: SHIPPED | OUTPATIENT
Start: 2018-11-05

## 2018-11-05 NOTE — PROGRESS NOTES
Reviewed record in preparation for visit and have obtained necessary documentation. Identified pt with two pt identifiers(name and ). Health Maintenance Due Topic  MenB Meningococcal topic (1 of 2 - Risk Bexsero 2-dose series)  Pneumococcal 19-64 Highest Risk (2 of 3 - PCV13)  Shingrix Vaccine Age 50> (1 of 2)  COLONOSCOPY  PAP AKA CERVICAL CYTOLOGY Chief Complaint Patient presents with  Leg Pain f/u 4 week  Neck Swelling Right side Wt Readings from Last 3 Encounters:  
18 152 lb 3.2 oz (69 kg) 10/08/18 152 lb (68.9 kg) 10/02/18 153 lb 9.6 oz (69.7 kg) Temp Readings from Last 3 Encounters:  
10/02/18 97.8 °F (36.6 °C) (Oral)  
16 98 °F (36.7 °C) (Oral) 16 98.7 °F (37.1 °C) (Oral) BP Readings from Last 3 Encounters:  
10/08/18 138/78  
10/02/18 118/88  
16 130/82 Pulse Readings from Last 3 Encounters:  
10/08/18 83  
10/02/18 82  
16 63 Learning Assessment: 
:  
 
Learning Assessment 10/8/2018 10/2/2018 2016 2014 PRIMARY LEARNER Patient Patient Patient Patient HIGHEST LEVEL OF EDUCATION - PRIMARY LEARNER  - 2 YEARS OF COLLEGE 4 YEARS OF COLLEGE 4 YEARS OF COLLEGE  
BARRIERS PRIMARY LEARNER - NONE NONE NONE  
CO-LEARNER CAREGIVER - - No No  
PRIMARY LANGUAGE ENGLISH ENGLISH ENGLISH ENGLISH  NEED - - - No  
LEARNER PREFERENCE PRIMARY DEMONSTRATION DEMONSTRATION DEMONSTRATION DEMONSTRATION  
LEARNING SPECIAL TOPICS - - - no ANSWERED BY patient patient self patient RELATIONSHIP SELF SELF SELF SELF  
ASSESSMENT COMMENT - - - [place] Depression Screening: 
:  
 
PHQ over the last two weeks 2018 Little interest or pleasure in doing things Not at all Feeling down, depressed, irritable, or hopeless Not at all Total Score PHQ 2 0 Fall Risk Assessment: 
:  
 
Fall Risk Assessment, last 12 mths 10/2/2018 Able to walk? Yes Fall in past 12 months?  No  
 
 
 Abuse Screening: 
:  
 
Abuse Screening Questionnaire 10/2/2018 4/12/2016 8/11/2014 Do you ever feel afraid of your partner? N N N Are you in a relationship with someone who physically or mentally threatens you? N N N Is it safe for you to go home? [place] Coordination of Care Questionnaire: 
:  
 
1) Have you been to an emergency room, urgent care clinic since your last visit? no  
Hospitalized since your last visit? no          
 
2) Have you seen or consulted any other health care providers outside of [address] [place] [address] since your last visit? no  (Include any pap smears or colon screenings in this section.) 3) Do you have an Advance Directive on file? no 
 
4) Are you interested in receiving information on Advance Directives? NO Patient is accompanied by self I have received verbal consent from Anne Jacobo to discuss any/all medical information while they are present in the room.

## 2018-11-05 NOTE — PATIENT INSTRUCTIONS
Magnesium glycinate 400 mg once a day in evening Vitamin CoQ10 100 mg once a day Ropinirole (By mouth) Ropinirole ([id number]) Treats Parkinson disease and restless legs syndrome (RLS). Brand Name(s): Requip, Requip XL There may be other brand names for this medicine. When This Medicine Should Not Be Used: This medicine is not right for everyone. Do not use it if you had an allergic reaction to ropinirole. How to Use This Medicine:  
Tablet, Long Acting Tablet · Take your medicine as directed. Your dose may need to be changed several times to find what works best for you. · The extended-release tablets work differently from the regular tablets, even at the same dose. Do not switch from one form to the other unless your doctor tells you to. · Swallow the extended-release tablet whole. Do not crush, break, or chew it. · Read and follow the patient instructions that come with this medicine. Talk to your doctor or pharmacist if you have any questions. · Missed dose: ¨ Parkinson disease: Take a dose as soon as you remember. If it is almost time for your next dose, wait until then and take a regular dose. Do not take extra medicine to make up for a missed dose. ¨ RLS: Skip the missed dose, and take your next dose at the regular time. Do not use extra medicine to make up for a missed dose. · Store the medicine in a closed container at room temperature, away from heat, moisture, and direct light. Drugs and Foods to Avoid: Ask your doctor or pharmacist before using any other medicine, including over-the-counter medicines, vitamins, and herbal products. · Some foods and medicines can affect how ropinirole works. Tell your doctor knows if you are using any of the following: ¨ Ciprofloxacin, levodopa, metoclopramide, thiothixene ¨ Birth control pills, or estrogen to treat menopausal symptoms ¨ Phenothiazine medicine (such as chlorpromazine, perphenazine, prochlorperazine, promethazine, thioridazine) · Tell your doctor if you use anything else that makes you sleepy. Some examples are allergy medicine, narcotic pain medicine, and alcohol. Warnings While Using This Medicine: · Tell your doctor if you are pregnant or breastfeeding, or if you have kidney disease, liver disease, a sleep disorder, high or low blood pressure, heart disease, heart rhythm problems, lung disease, dyskinesia, or a history of skin cancer or mental health problems. Tell your doctor if you smoke or drink alcohol. · This medicine may cause the following problems: 
¨ High or low blood pressure ¨ Hallucinations ¨ Dyskinesia (trouble controlling movements) ¨ Unusual changes in thoughts or behavior, such as the urge to [place] or an increased sex drive ¨ Increased risk for skin cancer · This medicine may make you dizzy or drowsy. It may even cause you to fall asleep without warning. Tell your doctor right away if you learn that this has happened. Do not drive or do anything that could be dangerous until you know how this medicine affects you. Stand up slowly if you are dizzy. · Do not stop using this medicine suddenly. Your doctor will need to slowly decrease your dose before you stop it completely. · Your doctor will check your progress and the effects of this medicine at regular visits. Keep all appointments. · Call your doctor if your symptoms do not improve or if they get worse. For RLS, the symptoms might get worse in the early morning, start earlier in the afternoon, or spread to your arms. · Keep all medicine out of the reach of children. Never share your medicine with anyone. Possible Side Effects While Using This Medicine:  
Call your doctor right away if you notice any of these side effects: · Allergic reaction: Itching or hives, swelling in your face or hands, swelling or tingling in your mouth or throat, chest tightness, trouble breathing · Chest pain, trouble breathing, fast or slow heartbeat · Confusion, unusual changes in mood or behavior, behaviors you cannot control · Extreme sleepiness or drowsiness · Fever, sweating, muscle stiffness · Lightheadedness, dizziness, fainting · Seeing, hearing, or feeling things that are not really there · Skin changes or growths · Twitching or muscle movements you cannot control (either new or worse than usual) If you notice these less serious side effects, talk with your doctor:  
· Headache · Nausea, vomiting, constipation, stomach upset · Tiredness If you notice other side effects that you think are caused by this medicine, tell your doctor. Call your doctor for medical advice about side effects. You may report side effects to FDA at 1-590-FDA-7660 © 2017 [facility] Information is for End User's use only and may not be sold, redistributed or otherwise used for commercial purposes. The above information is an  only. It is not intended as medical advice for individual conditions or treatments. Talk to your doctor, nurse or pharmacist before following any medical regimen to see if it is safe and effective for you.

## 2018-11-05 NOTE — PROGRESS NOTES
Primary Care Doctor is:  Jordan Louis MD 
Leg Pain (f/u 4 week ) and Neck Swelling (Right side ) LAST VISIT: 10/2/2018 HPI: 
Jaylin Jaimes is a 58y.o. year old female who is here for an acute care visit and has the following concerns: 
 
Right side worse than left. Posterior radiation to knees. Can't sleep. Restless at night. Does PT and stretching. Feels better to stretch. Right side thyroid getting bigger. Not tender. Wants it rechecked. Difficulty swallowing- none. Assessment and Plan 1. Restless legs High suspicion this is RLS. Try tonight- ok to advance to two a night The risks and benefits of the new medication were discussed as well as possible side effects. Patient is instructed to call if any new symptoms arise that are listed in the AVS printed or available on [employer] or discussed:  Agitation, nausea. - rOPINIRole (REQUIP) 0.25 mg tablet; Take 1 Tab by mouth nightly. Dispense: 30 Tab; Refill: 2 2. Thyroid cyst 
Recheck complex cyst.  Slight fullness on right Patient aware the above test was ordered and should call central scheduling or our office if no one contacts them. Discussed the importance and reason for the test -  
- US THYROID/PARATHYROID/SOFT TISS; Future Visit Vitals /80 (BP 1 Location: Right arm, BP Patient Position: Sitting) Pulse 70 Temp 98.8 °F (37.1 °C) (Oral) Resp 18 Ht 5' 7.5\" (1.715 m) Wt 152 lb 3.2 oz (69 kg) SpO2 95% BMI 23.49 kg/m² Historical Data Past Medical History:  
Diagnosis Date  Dysfunction of inner ear  History of splenectomy 10/3/2018  ITP (idiopathic thrombocytopenic purpura) Age 12  Multiple thyroid nodules Dr. Gus Sanchez  MVP (mitral valve prolapse) Dr. Sagar Truong  OA (osteoarthritis)  Osteopenia 7/19/16  Postmenopausal bleeding   
 neg eval by Dr. Kailyn Rowland  Rheumatoid factor positive 07/19/2016 false positive, negative eval by Dr. Sarah Benitez  Vitamin D insufficiency Past Surgical History:  
Procedure Laterality Date  HX COLONOSCOPY  12/7/07  
 normal, Dr. Nga Mcneal  12/7/12 R thyroid cyst [facility]  HX SPLENECTOMY  HX TONSILLECTOMY Outpatient Encounter Medications as of 11/5/2018 Medication Sig Dispense Refill  rOPINIRole (REQUIP) 0.25 mg tablet Take 1 Tab by mouth nightly. 30 Tab 2  Cholecalciferol, Vitamin D3, (VITAMIN D3) 2,000 unit cap capsule Take  by mouth daily.  VITAMIN B COMPLEX PO Take  by mouth.  multivitamin (ONE A DAY) tablet Take 1 Tab by mouth daily. 30 Tab 0 No facility-administered encounter medications on file as of 11/5/2018. Allergies Allergen Reactions  Zoloft [Sertraline] Other (comments) Decreased appetite  Benzodiazepines Other (comments) Clonzepam and Restoril  With sedation and fatigue  Lexapro [Escitalopram] Other (comments) Decreased appetite, tachycardia, diaphoresis and increased anxiety  Prozac [Fluoxetine] Other (comments) depression Social History Socioeconomic History  Marital status:  Spouse name: Not on file  Number of children: Not on file  Years of education: Not on file  Highest education level: Not on file Social Needs  Financial resource strain: Not on file  Food insecurity - worry: Not on file  Food insecurity - inability: Not on file  Transportation needs - medical: Not on file  Transportation needs - non-medical: Not on file Occupational History  Not on file Tobacco Use  Smoking status: Never Smoker  Smokeless tobacco: Never Used Substance and Sexual Activity  Alcohol use: Yes Comment: occ  Drug use: No  
 Sexual activity: Yes  
  Partners: Male Other Topics Concern  Not on file Social History Narrative  Not on file family history includes Diabetes in her father; Heart Disease in her father; Hypertension in her father; No Known Problems in her mother; Stroke in her father. Review of Systems Constitutional: Negative for weight loss. Eyes: Negative for blurred vision. Respiratory: Negative for shortness of breath. Cardiovascular: Negative for chest pain. Gastrointestinal: Negative for abdominal pain. Genitourinary: Negative for dysuria and frequency. Musculoskeletal: Positive for myalgias. Negative for falls and neck pain. Skin: Negative for rash. Neurological: Negative for dizziness, focal weakness, weakness and headaches. Endo/Heme/Allergies: Negative for environmental allergies. Does not bruise/bleed easily. Physical Exam  
Constitutional: She is oriented to person, place, and time and well-developed, well-nourished, and in no distress. Vital signs are normal. She appears not dehydrated. She appears healthy. Non-toxic appearance. She does not have a sickly appearance. No distress. Eyes: Conjunctivae are normal.  
Cardiovascular: Normal rate and regular rhythm. Pulmonary/Chest: Effort normal and breath sounds normal.  
Abdominal: Soft. Bowel sounds are normal. She exhibits no distension. There is no tenderness. Musculoskeletal: She exhibits no edema or deformity. Right knee: She exhibits normal range of motion, no swelling, no effusion and no deformity. No tenderness found. No calf tightness No back issues on exam. 
Good ROM and not tender Pulses palpable Neurological: She is alert and oriented to person, place, and time. Gait normal.  
Skin: Skin is warm and dry. Psychiatric: Mood and affect normal.  
 
Right Knee Exam  
 
Other Effusion: no effusion present I have reviewed the patient's medical history in detail and updated the computerized patient record.   
 
We had a prolonged discussion about these complex clinical issues and went over the various important aspects to consider. All questions were answered. Advised her to call back or return to office if symptoms do not improve, change in nature, or persist. 
 
She was given an after visit summary or informed of [employer] which includes patient instructions, diagnoses, current medications, & vitals. She expressed understanding with the diagnosis and plan.

## 2018-11-06 ENCOUNTER — HOSPITAL ENCOUNTER (OUTPATIENT)
Dept: PHYSICAL THERAPY | Age: 63
Discharge: HOME | End: 2018-11-06
Payer: COMMERCIAL

## 2018-11-06 PROCEDURE — 97110 THERAPEUTIC EXERCISES: CPT | Performed by: PHYSICAL THERAPIST

## 2018-11-06 NOTE — PROGRESS NOTES
PT DAILY TREATMENT NOTE - [facility] 2-15 Patient Name: Justyna Shultz Date:2018 : 1955 [x]  Patient  Verified Payor: Jennifer Barrios / Plan: Annalee You / Product Type: PPO / In time:4:20  Out time:5:05 Total Treatment Time (min): 45 Total Timed Codes (min): 45 Visit #: 2 Treatment Area: Low back pain [M54.5] SUBJECTIVE Pain Level (0-10 scale): 2 Any medication changes, allergies to medications, adverse drug reactions, diagnosis change, or new procedure performed?: [x] No    [] Yes (see summary sheet for update) Subjective functional status/changes:   [] No changes reported \"I saw Dr. Ramon Garcia and he thinks my symptoms may not be coming from my lumbar spine\" Patient reports that she started taking magnesium and medication for restless leg syndrome. OBJECTIVE 45 min Therapeutic Exercise:  [x] See flow sheet : Added hamstring stretch, supine figure 4 stretch, PPT with marching, PPT with rows and B shoulder extension red TB Rationale: increase ROM and increase strength to improve the patients ability to stand and sit to drive without pain With 
 [] TE 
 [] TA 
 [] neuro 
 [] other: Patient Education: [x] Review HEP [] Progressed/Changed HEP based on:  
[] positioning   [] body mechanics   [] transfers   [] heat/ice application   
[] other:   
 
Other Objective/Functional Measures: nt 
 
Pain Level (0-10 scale) post treatment: 1 ASSESSMENT/Changes in Function:  
Patient tolerated progression of exercise program well today. Patient will continue to benefit from skilled PT services to modify and progress therapeutic interventions, address functional mobility deficits, address ROM deficits, address strength deficits, analyze and address soft tissue restrictions, analyze and cue movement patterns, analyze and modify body mechanics/ergonomics and assess and modify postural abnormalities to attain remaining goals. []  See Plan of Care []  See progress note/recertification 
[]  See Discharge Summary Progress towards goals / Updated goals: 
Short Term Goals: To be accomplished in 2 weeks: 1. Patient will be I in HEP to promote self management of symptoms. MET 2. Patient will report pain level at worst as less than or equal to 4/10 so they can be performed without pain. Long Term Goals: To be accomplished in 4-6 weeks: 1.  Patient will report pain level at worst as less than or equal to 2/10 so they can be performed without pain. 2. The patient will be able to drive greater than or equal to 45 min without increase in low back or LE pain. 3.The patient will be able to stand greater than or equal to 2 hours without increase in low back or LE pain. PLAN 
[]  Upgrade activities as tolerated     [x]  Continue plan of care 
[]  Update interventions per flow sheet      
[]  Discharge due to:_ 
[]  Other:_ George Martinez, PT 11/6/2018  4:24 PM

## 2018-11-12 ENCOUNTER — HOSPITAL ENCOUNTER (OUTPATIENT)
Dept: PHYSICAL THERAPY | Age: 63
Discharge: HOME | End: 2018-11-12
Payer: COMMERCIAL

## 2018-11-12 PROCEDURE — 97140 MANUAL THERAPY 1/> REGIONS: CPT

## 2018-11-12 PROCEDURE — 97110 THERAPEUTIC EXERCISES: CPT

## 2018-11-12 NOTE — PROGRESS NOTES
PT DAILY TREATMENT NOTE - [facility] 2-15 Patient Name: Kartik London Date:2018 : 1955 [x]  Patient  Verified Payor: Nancy Knapp / Plan: Tarun Rosen / Product Type: PPO / In time:230  Out time: 315 Total Treatment Time (min): 45 Total Timed Codes (min): 45 Visit #: 4 Treatment Area: Low back pain [M54.5] SUBJECTIVE Pain Level (0-10 scale): 2 Any medication changes, allergies to medications, adverse drug reactions, diagnosis change, or new procedure performed?: [x] No    [] Yes (see summary sheet for update) Subjective functional status/changes:   [] No changes reported Stretches help her low back feel better. She can walk all day long on a flat surface, but she struggles when having to walk up hill. Her legs fatigue walking up steps. She is active - walking 10K steps/day, kam classes. OBJECTIVE Reviewed her MRI and X-rays results Difficulty identifying any consistent provacative positions/activities relative to her cramping in her legs. 25 min Therapeutic Exercise:  [x] See flow sheet : Added hamstring stretch, supine figure 4 stretch, PPT with marching, PPT with rows and B shoulder extension red TB Rationale: increase ROM and increase strength to improve the patients ability to stand and sit to drive without pain MANUAL:  PA mobs lower lumbar spine, manual hip and back stretches, long axis distraction to both legs, lumbar traction with belt 15 minutes With 
 [] TE 
 [] TA 
 [] neuro 
 [] other: Patient Education: [x] Review HEP [] Progressed/Changed HEP based on:  
[] positioning   [] body mechanics   [] transfers   [] heat/ice application   
[] other: asked patient to really focus on the position of her low back when her cramping gets worse, trying to identify if this is more flexion or extension biased if anything. She felt good with the traction.     
 
Other Objective/Functional Measures: nt 
 
 Pain Level (0-10 scale) post treatment: 1 ASSESSMENT/Changes in Function:  
Patient tolerated progression of exercise program well today. Reassess next visit. Patient will continue to benefit from skilled PT services to modify and progress therapeutic interventions, address functional mobility deficits, address ROM deficits, address strength deficits, analyze and address soft tissue restrictions, analyze and cue movement patterns, analyze and modify body mechanics/ergonomics and assess and modify postural abnormalities to attain remaining goals. [x]  See Plan of Care 
[]  See progress note/recertification 
[]  See Discharge Summary Progress towards goals / Updated goals: 
Short Term Goals: To be accomplished in 2 weeks: 1. Patient will be I in HEP to promote self management of symptoms. MET 2. Patient will report pain level at worst as less than or equal to 4/10 so they can be performed without pain. Long Term Goals: To be accomplished in 4-6 weeks: 1.  Patient will report pain level at worst as less than or equal to 2/10 so they can be performed without pain. 2. The patient will be able to drive greater than or equal to 45 min without increase in low back or LE pain. 3.The patient will be able to stand greater than or equal to 2 hours without increase in low back or LE pain. PLAN 
[]  Upgrade activities as tolerated     [x]  Continue plan of care  1 more visit scheduled next week 
[]  Update interventions per flow sheet      
[]  Discharge due to:_ 
[]  Other:_ Diana Begum, PT 11/12/2018  4:24 PM

## 2018-11-13 ENCOUNTER — HOSPITAL ENCOUNTER (OUTPATIENT)
Dept: ULTRASOUND IMAGING | Age: 63
Discharge: HOME | End: 2018-11-13
Attending: INTERNAL MEDICINE
Payer: COMMERCIAL

## 2018-11-13 DIAGNOSIS — E04.1 THYROID CYST: ICD-10-CM

## 2018-11-13 DIAGNOSIS — E04.1 THYROID CYST: Primary | ICD-10-CM

## 2018-11-13 PROCEDURE — 76536 US EXAM OF HEAD AND NECK: CPT

## 2018-11-20 ENCOUNTER — APPOINTMENT (OUTPATIENT)
Dept: PHYSICAL THERAPY | Age: 63
End: 2018-11-20
Payer: COMMERCIAL

## 2018-11-27 ENCOUNTER — HOSPITAL ENCOUNTER (OUTPATIENT)
Dept: PHYSICAL THERAPY | Age: 63
Discharge: HOME | End: 2018-11-27
Payer: COMMERCIAL

## 2018-11-27 PROCEDURE — 97110 THERAPEUTIC EXERCISES: CPT | Performed by: PHYSICAL THERAPIST

## 2018-11-27 PROCEDURE — 97140 MANUAL THERAPY 1/> REGIONS: CPT | Performed by: PHYSICAL THERAPIST

## 2018-11-27 NOTE — PROGRESS NOTES
PT DAILY TREATMENT NOTE - [facility] 2-15 Patient Name: Clinton Alcantar Date:2018 : 1955 [x]  Patient  Verified Payor: Ruth Ann Riddle / Plan: [employer] / Product Type: PPO / In time:405P  Out time: 500P Total Treatment Time (min): 55 Total Timed Codes (min): 55 Visit #: 4 Treatment Area: Low back pain [M54.5] SUBJECTIVE Pain Level (0-10 scale): 2 Any medication changes, allergies to medications, adverse drug reactions, diagnosis change, or new procedure performed?: [x] No    [] Yes (see summary sheet for update) Subjective functional status/changes:   [] No changes reported Patient reports that her legs feel the same as when she started therapy. The stretches feel good while she is doing them but do not provide lasting relief of symptoms. She continues to experience LE cramping pain with prolonged standing and walking at work. OBJECTIVE Difficulty identifying any consistent provacative positions/activities relative to her cramping in her legs. 40 min Therapeutic Exercise:  [x] See flow sheet :   
Rationale: increase ROM and increase strength to improve the patients ability to stand and sit to drive without pain MANUAL:  manual hip and back stretches, lumbar traction with belt 15 minutes With 
 [] TE 
 [] TA 
 [] neuro 
 [] other: Patient Education: [x] Review HEP [] Progressed/Changed HEP based on:  
[] positioning   [] body mechanics   [] transfers   [] heat/ice application   
[] other:   
 
Other Objective/Functional Measures: nt 
 
Pain Level (0-10 scale) post treatment: 1 ASSESSMENT/Changes in Function:  
Patient continues to present with LE cramping despite progression of exercise program and manual techniques.   
Patient will continue to benefit from skilled PT services to modify and progress therapeutic interventions, address functional mobility deficits, address ROM deficits, address strength deficits, analyze and address soft tissue restrictions, analyze and cue movement patterns, analyze and modify body mechanics/ergonomics and assess and modify postural abnormalities to attain remaining goals. [x]  See Plan of Care 
[]  See progress note/recertification 
[]  See Discharge Summary Progress towards goals / Updated goals: 
Short Term Goals: To be accomplished in 2 weeks: 1. Patient will be I in HEP to promote self management of symptoms. MET 2. Patient will report pain level at worst as less than or equal to 4/10 so they can be performed without pain. NOT MET Long Term Goals: To be accomplished in 4-6 weeks: 1.  Patient will report pain level at worst as less than or equal to 2/10 so they can be performed without pain. 2. The patient will be able to drive greater than or equal to 45 min without increase in low back or LE pain. 3.The patient will be able to stand greater than or equal to 2 hours without increase in low back or LE pain. PLAN 
[]  Upgrade activities as tolerated     []  Continue plan of care  
[]  Update interventions per flow sheet [x]  Discharge due to:_ limited progress toward goals  
[]  Other:_ Liliana Thomas, PT 11/27/2018  4:24 PM

## 2018-11-28 ENCOUNTER — OFFICE VISIT (OUTPATIENT)
Dept: ENDOCRINOLOGY | Age: 63
End: 2018-11-28

## 2018-11-28 VITALS
WEIGHT: 152.6 LBS | BODY MASS INDEX: 23.95 KG/M2 | SYSTOLIC BLOOD PRESSURE: 111 MMHG | RESPIRATION RATE: 12 BRPM | DIASTOLIC BLOOD PRESSURE: 79 MMHG | HEART RATE: 65 BPM | HEIGHT: 67 IN | OXYGEN SATURATION: 97 %

## 2018-11-28 DIAGNOSIS — E04.1 THYROID NODULE: Primary | ICD-10-CM

## 2018-11-28 DIAGNOSIS — R25.2 MUSCLE CRAMPS: ICD-10-CM

## 2018-11-28 NOTE — PROGRESS NOTES
History of Present Illness: Clinton Alcantar is a 58 y.o. female presents for follow-up of thyroid nodule  Initial visit was in 2012. She was seen again in 2013, 2016 and returns today. She noted her thyroid nodule was larger to Dr Juan Cabello. This lead to repeat ultrasound, which showed an increase in size of cystic nodule  She returns today to discuss this ultrasound and further management of nodule    History review:   She noted neck mass on right side in 11/2012. Initial ultrasound was done 11/21 and showed a almost 3 cm mostly cystic nodule with a 7mm mural solid component. FNA was done 12/7 and returned with benign pathology ('colloid, follicular and Hurthle cells and numerous foamy macrophages'). Nodule was then smaller in late 2013, but increased in size to 2016 and again in 2018. Current size appears to be marginally larger than 2012 study .     She currently has no dysphagia, pain, hoarseness or any related sx. However, she is aware of the nodule being larger. TSH is normal although has trended lower over last 6 years. She has been having some problems with leg cramps and stiffness and ? Mild weakness. Has seen PT and neurology . She is doing some stretching now at home.      Social  Brother dx with ALS. Mother in law has dementia. Past Medical History:   Diagnosis Date    Dysfunction of inner ear     History of splenectomy 10/3/2018    ITP (idiopathic thrombocytopenic purpura)     Age 12    Multiple thyroid nodules     Dr. Scarlet Marrero MVP (mitral valve prolapse)     Dr. Mary Alcaraz OA (osteoarthritis)     Osteopenia 7/19/16    Postmenopausal bleeding     neg eval by Dr. Maria T Camilo Rheumatoid factor positive 07/19/2016    false positive, negative eval by Dr. Jose A Leal Vitamin D insufficiency      Current Outpatient Medications   Medication Sig    magnesium hydroxide (MAGNESIA PO) Take  by mouth daily.  rOPINIRole (REQUIP) 0.25 mg tablet Take 1 Tab by mouth nightly.     Cholecalciferol, Vitamin D3, (VITAMIN D3) 2,000 unit cap capsule Take  by mouth daily.  VITAMIN B COMPLEX PO Take  by mouth.  multivitamin (ONE A DAY) tablet Take 1 Tab by mouth daily. No current facility-administered medications for this visit. Allergies   Allergen Reactions    Zoloft [Sertraline] Other (comments)     Decreased appetite    Benzodiazepines Other (comments)      Clonzepam and Restoril  With sedation and fatigue    Lexapro [Escitalopram] Other (comments)     Decreased appetite, tachycardia, diaphoresis and increased anxiety    Prozac [Fluoxetine] Other (comments)     depression       Review of Systems:  - Eyes: no blurry vision or double vision  - Cardiovascular: no palpitations. - Respiratory: no shortness of breath  - Musculoskeletal: see HPI      Physical Examination:  Visit Vitals  /79 (BP 1 Location: Left arm, BP Patient Position: Sitting)   Pulse 65   Resp 12   Ht 5' 7\" (1.702 m)   Wt 152 lb 9.6 oz (69.2 kg)   SpO2 97%   BMI 23.90 kg/m²   -   - General: pleasant, no distress, normal gait   HEENT: hearing intact, EOMI, clear sclera without icterus  - Neck: + right thyromegaly - firm - moves with swallowing- about 3 cm. No LAD  - Cardiovascular: regular, normal rate   - Respiratory: normal effort  - Integumentary: no edema  - Psychiatric: normal mood and affect    Data Reviewed:   11/13/2018  ULTRASOUND OF THE THYROID.      INDICATION: Mass, lump or swelling, neck; right side swelling getting bigger. Nontoxic single thyroid nodule.     COMPARISON: 7/27/2016.     TECHNIQUE: Sonography of the thyroid was performed.     FINDINGS:      RIGHT LOBE: measures 37 x 18 x 26 mm. A predominantly cystic nodule has  increased in size and cystic volume, and now measures 42 x 13 x 23 mm, versus 29  x 13 x 9 mm on 7/27/2016. It replaces most of the right lobe. Small peripheral  solid components and septations are similar in volume to 2016.  The increase in  size is attributable to the increased cystic/fluid volume. Waldemar Butterfield LEFT LOBE: measures 46 x 10 x 13 mm. Atrophic lobe. Incidental subcentimeter  circumscribed nodule. ISTHMUS: No nodule. PARENCHYMA: The left lobe parenchyma is hypoechoic and atrophic.      IMPRESSION  IMPRESSION:  1. Increased right lobe cystic nodule. The increase in size is attributable to  the increase in cystic/fluid components. 2. Atrophic background parenchyma. Component      Latest Ref Rng & Units 10/2/2018 7/18/2016 8/14/2014 11/23/2012           2:50 PM  9:01 AM  7:06 AM  7:15 AM   TSH      0.450 - 4.500 uIU/mL 0.864 1.120 1.840 2.150     Assessment/Plan:   1. Thyroid nodule   - reviewed ultrasound reports from 2012,2013, 2016 and 2018 to compare sizes of nodule. Nodule was smaller after aspiration in late 2012, then gradually increased in size over next 6 years to current size which is similar in size to 2012, but perhaps a little larger. Personally reviewed ultrasound images with Mrs Kaylynn Ferrell present. Nodule is about 90% cystic and has a small solid component that lacks concerning features. - could have aspiration with ethanol injection to see if this shrinks cyst and keeps it from re-accumulating. She does not feel need for this at this time as she is asymptomatic. Also could consider partial thyroidectomy if she develops sx  - recommend checking TSH yearly. TSH has trended down over last 6 years. Discussed how nodules can develop autonomy. 2. Muscle cramps   - could try mustard to abort cramps. Recommend stretching. Greater than 50% of 25 minute visit was spent counseling the patient about above. Patient Instructions   Thyroid nodule: Increase in size due to cystic component. No concerning features on ultrasound. Could consider another aspiration biopsy in future  Could also consider surgery, if needed. Recommend following thyroid function yearly. Follow-up Disposition:  Return in about 1 year (around 11/28/2019).     Copy sent to:

## 2018-11-28 NOTE — PATIENT INSTRUCTIONS
Thyroid nodule: Increase in size due to cystic component. No concerning features on ultrasound. Could consider another aspiration biopsy in future  Could also consider surgery, if needed. Recommend following thyroid function yearly.

## 2018-11-28 NOTE — PROGRESS NOTES
[facility] is a 58 y.o. female     Chief Complaint   Patient presents with    Follow-up     Last Office visit 2016    Thyroid Problem    Cyst     Neck    Results     Ultrasound results in CC. 1. Have you been to the ER, urgent care clinic since your last visit? Hospitalized since your last visit? No    2. Have you seen or consulted any other health care providers outside of the [address] [place] [address] since your last visit? Include any pap smears or colon screening.  No

## 2019-01-08 ENCOUNTER — OFFICE VISIT (OUTPATIENT)
Dept: INTERNAL MEDICINE CLINIC | Age: 64
End: 2019-01-08

## 2019-01-08 VITALS
WEIGHT: 153.8 LBS | RESPIRATION RATE: 16 BRPM | HEIGHT: 67 IN | DIASTOLIC BLOOD PRESSURE: 86 MMHG | HEART RATE: 100 BPM | TEMPERATURE: 98.4 F | BODY MASS INDEX: 24.14 KG/M2 | SYSTOLIC BLOOD PRESSURE: 140 MMHG | OXYGEN SATURATION: 96 %

## 2019-01-08 DIAGNOSIS — M62.81 MUSCLE WEAKNESS: Primary | ICD-10-CM

## 2019-01-08 DIAGNOSIS — F41.9 ANXIETY: ICD-10-CM

## 2019-01-08 RX ORDER — CLONAZEPAM 0.5 MG/1
0.5 TABLET ORAL
Qty: 30 TAB | Refills: 0 | Status: SHIPPED | OUTPATIENT
Start: 2019-01-08

## 2019-01-08 NOTE — PROGRESS NOTES
Leg Pain (ongoing since September 2018. bilateral. f/u) and Fatigue HPI: 
Catarino Mayfield is a 58y.o. year old female who is here for a follow up visit. She was last seen by me on 11/5/2018. She reports the following: 
 
Cramping in hands in addition to leg issues. This is new. Wants something new for anxiety. Used to take zoloft and lexapro made worse Used to be on clonazepam for vestibular dysfunction Assessment and Plan 1. Muscle weakness Unclear etiology. May need EMG to evaluate further. Negative weakness on exam.  
- LYME AB TOTAL W/RFLX W BLOT 
- VITAMIN C17 
- METABOLIC PANEL, COMPREHENSIVE 
- CBC WITH AUTOMATED DIFF; Future 
- HIV 1/2 AG/AB, 4TH GENERATION,W RFLX CONFIRM 
- CK 
- MAGNESIUM 
- REFERRAL TO NEUROLOGY 
- clonazePAM (KLONOPIN) 0.5 mg tablet; Take 1 Tab by mouth nightly as needed. Max Daily Amount: 0.5 mg.  Dispense: 30 Tab; Refill: 0 
- CBC WITH AUTOMATED DIFF 2. Anxiety Ok to use prn. See if legs improve with reduced anxiety 
- clonazePAM (KLONOPIN) 0.5 mg tablet; Take 1 Tab by mouth nightly as needed. Max Daily Amount: 0.5 mg.  Dispense: 30 Tab; Refill: 0 Visit Vitals /86 (BP 1 Location: Left arm, BP Patient Position: Sitting) Pulse 100 Temp 98.4 °F (36.9 °C) (Oral) Resp 16 Ht 5' 7\" (1.702 m) Wt 153 lb 12.8 oz (69.8 kg) SpO2 96% BMI 24.09 kg/m² Historical Data Past Medical History:  
Diagnosis Date  Dysfunction of inner ear  History of splenectomy 10/3/2018  ITP (idiopathic thrombocytopenic purpura) Age 12  Multiple thyroid nodules Dr. Sabrina Maddox  MVP (mitral valve prolapse) Dr. Betty Galeano  OA (osteoarthritis)  Osteopenia 7/19/16  Postmenopausal bleeding   
 neg eval by Dr. Sara Dhaliwal  Rheumatoid factor positive 07/19/2016  
 false positive, negative eval by Dr. Boland Corporal  Vitamin D insufficiency Past Surgical History:  
Procedure Laterality Date  HX COLONOSCOPY  12/7/07  
 normal, Dr. Garnica Books  12/7/12 R thyroid cyst FNA  HX SPLENECTOMY  HX TONSILLECTOMY Outpatient Encounter Medications as of 1/8/2019 Medication Sig Dispense Refill  cyanocobalamin, vitamin B-12, (VITAMIN B-12 PO) Take  by mouth.  clonazePAM (KLONOPIN) 0.5 mg tablet Take 1 Tab by mouth nightly as needed. Max Daily Amount: 0.5 mg. 30 Tab 0  Cholecalciferol, Vitamin D3, (VITAMIN D3) 2,000 unit cap capsule Take  by mouth daily.  VITAMIN B COMPLEX PO Take  by mouth.  multivitamin (ONE A DAY) tablet Take 1 Tab by mouth daily. 30 Tab 0  
 magnesium hydroxide (MAGNESIA PO) Take  by mouth daily.  rOPINIRole (REQUIP) 0.25 mg tablet Take 1 Tab by mouth nightly. 30 Tab 2 No facility-administered encounter medications on file as of 1/8/2019. Allergies Allergen Reactions  Zoloft [Sertraline] Other (comments) Decreased appetite  Benzodiazepines Other (comments) Clonzepam and Restoril  With sedation and fatigue  Lexapro [Escitalopram] Other (comments) Decreased appetite, tachycardia, diaphoresis and increased anxiety  Prozac [Fluoxetine] Other (comments) depression Social History Socioeconomic History  Marital status:  Spouse name: Not on file  Number of children: Not on file  Years of education: Not on file  Highest education level: Not on file Social Needs  Financial resource strain: Not on file  Food insecurity - worry: Not on file  Food insecurity - inability: Not on file  Transportation needs - medical: Not on file  Transportation needs - non-medical: Not on file Occupational History  Not on file Tobacco Use  Smoking status: Never Smoker  Smokeless tobacco: Never Used Substance and Sexual Activity  Alcohol use: Yes Comment: occ  Drug use: No  
 Sexual activity: Yes  
  Partners: Male Other Topics Concern  Not on file Social History Narrative  Not on file  
  
 
family history includes Diabetes in her father; Heart Disease in her father; Hypertension in her father; No Known Problems in her mother; Stroke in her father. Review of Systems Constitutional: Negative for fever and weight loss. Eyes: Negative for blurred vision. Respiratory: Negative for shortness of breath. Cardiovascular: Negative for chest pain. Gastrointestinal: Negative for abdominal pain. Genitourinary: Negative for dysuria and frequency. Skin: Negative for rash. Neurological: Positive for focal weakness. Negative for dizziness, tremors, sensory change, weakness and headaches. Endo/Heme/Allergies: Negative for environmental allergies. Does not bruise/bleed easily. Physical Exam  
Constitutional: She appears well-developed and well-nourished. She is active. Non-toxic appearance. She does not have a sickly appearance. She does not appear ill. No distress. Eyes: Conjunctivae are normal. Right eye exhibits no discharge. Neck: Carotid bruit is not present. No thyroid mass and no thyromegaly present. Cardiovascular: Normal rate, regular rhythm, S1 normal, S2 normal, normal heart sounds and normal pulses. Exam reveals no gallop and no friction rub. Pulmonary/Chest: Effort normal and breath sounds normal. No respiratory distress. Abdominal: Soft. Bowel sounds are normal.  
Musculoskeletal: She exhibits no edema or deformity. Neurological: She is alert. Coordination normal.  
Skin: Skin is warm and dry. No rash noted. No pallor. Psychiatric: She has a normal mood and affect. Her behavior is normal.  
Vitals reviewed. Ortho Exam 
 
 
Orders Placed This Encounter  LYME AB TOTAL W/RFLX W BLOT  VITAMIN Q83  
 METABOLIC PANEL, COMPREHENSIVE  
 CBC WITH AUTOMATED DIFF Standing Status:   Future   Standing Expiration Date:   7/7/2019  
 HIV 1/2 AG/AB, 4TH GENERATION,W RFLX CONFIRM  
 CK  
  MAGNESIUM  
 CBC WITH AUTOMATED DIFF  
Oksana Southe Neurology ref [facility] Referral Priority:   Routine Referral Type:   Consultation Referral Reason:   Specialty Services Required Referred to Provider:   Yaneth Escoto DO  
  Number of Visits Requested:   1  cyanocobalamin, vitamin B-12, (VITAMIN B-12 PO) Sig: Take  by mouth.  clonazePAM (KLONOPIN) 0.5 mg tablet Sig: Take 1 Tab by mouth nightly as needed. Max Daily Amount: 0.5 mg.  
  Dispense:  30 Tab Refill:  0 I have reviewed the patient's medical history in detail and updated the computerized patient record. We had a prolonged discussion about these complex clinical issues and went over the various important aspects to consider. All questions were answered. Advised her to call back or return to office if symptoms do not improve, change in nature, or persist. 
 
She was given an after visit summary or informed of [employer] which includes patient instructions, diagnoses, current medications, & vitals. She expressed understanding with the diagnosis and plan.

## 2019-01-08 NOTE — PROGRESS NOTES
Lubna Arredondo is a 58 y.o. female Chief Complaint Patient presents with  Leg Pain  
  ongoing since September 2018. bilateral. f/u  Fatigue 1. Have you been to the ER, urgent care clinic since your last visit? Hospitalized since your last visit? No 
 
2. Have you seen or consulted any other health care providers outside of the [address] [place] [address] since your last visit? Include any pap smears or colon screening. No  
 
Visit Vitals /86 (BP 1 Location: Left arm, BP Patient Position: Sitting) Pulse 100 Temp 98.4 °F (36.9 °C) (Oral) Resp 16 Ht 5' 7\" (1.702 m) Wt 153 lb 12.8 oz (69.8 kg) SpO2 96% BMI 24.09 kg/m² Health Maintenance Due Topic Date Due  MenB Meningococcal topic (1 of 2 - Risk Bexsero 2-dose series) 07/31/1965  Pneumococcal 19-64 Highest Risk (2 of 3 - PCV13) 01/14/2000  Shingrix Vaccine Age 50> (1 of 2) 07/31/2004  COLONOSCOPY  12/07/2017  PAP AKA CERVICAL CYTOLOGY  01/01/2019 Scarlett Christiansen LPN

## 2019-01-10 ENCOUNTER — PATIENT MESSAGE (OUTPATIENT)
Dept: INTERNAL MEDICINE CLINIC | Age: 64
End: 2019-01-10

## 2019-01-10 DIAGNOSIS — R29.898 WEAKNESS OF BOTH LEGS: Primary | ICD-10-CM

## 2019-01-10 LAB
ALBUMIN SERPL-MCNC: 4.7 G/DL (ref 3.6–4.8)
ALBUMIN/GLOB SERPL: 1.7 {RATIO} (ref 1.2–2.2)
ALP SERPL-CCNC: 65 IU/L (ref 39–117)
ALT SERPL-CCNC: 37 IU/L (ref 0–32)
AST SERPL-CCNC: 41 IU/L (ref 0–40)
B BURGDOR IGG+IGM SER-ACNC: <0.91 ISR (ref 0–0.9)
BASOPHILS # BLD AUTO: 0 X10E3/UL (ref 0–0.2)
BASOPHILS NFR BLD AUTO: 0 %
BILIRUB SERPL-MCNC: 0.4 MG/DL (ref 0–1.2)
BUN SERPL-MCNC: 22 MG/DL (ref 8–27)
BUN/CREAT SERPL: 26 (ref 12–28)
CALCIUM SERPL-MCNC: 10 MG/DL (ref 8.7–10.3)
CHLORIDE SERPL-SCNC: 101 MMOL/L (ref 96–106)
CK SERPL-CCNC: 93 U/L (ref 24–173)
CO2 SERPL-SCNC: 23 MMOL/L (ref 20–29)
CREAT SERPL-MCNC: 0.86 MG/DL (ref 0.57–1)
EOSINOPHIL # BLD AUTO: 0.1 X10E3/UL (ref 0–0.4)
EOSINOPHIL NFR BLD AUTO: 1 %
ERYTHROCYTE [DISTWIDTH] IN BLOOD BY AUTOMATED COUNT: 13.1 % (ref 12.3–15.4)
GLOBULIN SER CALC-MCNC: 2.7 G/DL (ref 1.5–4.5)
GLUCOSE SERPL-MCNC: 86 MG/DL (ref 65–99)
HCT VFR BLD AUTO: 41.6 % (ref 34–46.6)
HGB BLD-MCNC: 14 G/DL (ref 11.1–15.9)
HIV 1+2 AB+HIV1 P24 AG SERPL QL IA: NON REACTIVE
IMM GRANULOCYTES # BLD AUTO: 0 X10E3/UL (ref 0–0.1)
IMM GRANULOCYTES NFR BLD AUTO: 0 %
LYMPHOCYTES # BLD AUTO: 2.2 X10E3/UL (ref 0.7–3.1)
LYMPHOCYTES NFR BLD AUTO: 26 %
MAGNESIUM SERPL-MCNC: 2.1 MG/DL (ref 1.6–2.3)
MCH RBC QN AUTO: 34.3 PG (ref 26.6–33)
MCHC RBC AUTO-ENTMCNC: 33.7 G/DL (ref 31.5–35.7)
MCV RBC AUTO: 102 FL (ref 79–97)
MONOCYTES # BLD AUTO: 1 X10E3/UL (ref 0.1–0.9)
MONOCYTES NFR BLD AUTO: 12 %
NEUTROPHILS # BLD AUTO: 5.1 X10E3/UL (ref 1.4–7)
NEUTROPHILS NFR BLD AUTO: 61 %
PLATELET # BLD AUTO: 307 X10E3/UL (ref 150–379)
POTASSIUM SERPL-SCNC: 4.5 MMOL/L (ref 3.5–5.2)
PROT SERPL-MCNC: 7.4 G/DL (ref 6–8.5)
RBC # BLD AUTO: 4.08 X10E6/UL (ref 3.77–5.28)
SODIUM SERPL-SCNC: 142 MMOL/L (ref 134–144)
VIT B12 SERPL-MCNC: 862 PG/ML (ref 232–1245)
WBC # BLD AUTO: 8.5 X10E3/UL (ref 3.4–10.8)

## 2019-01-10 NOTE — PROGRESS NOTES
Tests came back ok including lyme and HIV. The liver enzymes are mildly elevated but wouldn't correspond to any of your symptoms. I am wondering though if we should check your liver though and find out why they are up. Did you drink any alcohol before the blood test (night before?) Message sent to patient via [employer]: 
January 10, 2019

## 2019-01-12 DIAGNOSIS — R29.898 WEAKNESS OF BOTH LEGS: ICD-10-CM

## 2019-01-12 DIAGNOSIS — R79.89 ABNORMAL LFTS: Primary | ICD-10-CM

## 2019-01-12 DIAGNOSIS — R10.84 GENERALIZED ABDOMINAL PAIN: ICD-10-CM

## 2019-01-15 NOTE — TELEPHONE ENCOUNTER
From: Juan Atwood  To: Cristóbal Campbell MD  Sent: 1/10/2019 1:33 PM EST  Subject: Non-Urgent Medical Question    Thanks for the quick response. In answer to your question I did not consume any alcohol Monday or Tuesday prior to my blood tests. Whats next?

## 2019-01-28 ENCOUNTER — HOSPITAL ENCOUNTER (OUTPATIENT)
Dept: ULTRASOUND IMAGING | Age: 64
Discharge: HOME | End: 2019-01-28
Attending: INTERNAL MEDICINE
Payer: COMMERCIAL

## 2019-01-28 ENCOUNTER — HOSPITAL ENCOUNTER (OUTPATIENT)
Dept: MAMMOGRAPHY | Age: 64
Discharge: HOME | End: 2019-01-28
Attending: OBSTETRICS & GYNECOLOGY
Payer: COMMERCIAL

## 2019-01-28 ENCOUNTER — TELEPHONE (OUTPATIENT)
Dept: INTERNAL MEDICINE CLINIC | Age: 64
End: 2019-01-28

## 2019-01-28 DIAGNOSIS — Z12.31 VISIT FOR SCREENING MAMMOGRAM: ICD-10-CM

## 2019-01-28 DIAGNOSIS — R10.84 GENERALIZED ABDOMINAL PAIN: ICD-10-CM

## 2019-01-28 DIAGNOSIS — R79.89 ABNORMAL LFTS: ICD-10-CM

## 2019-01-28 DIAGNOSIS — R29.898 WEAKNESS OF BOTH LEGS: ICD-10-CM

## 2019-01-28 PROCEDURE — 77067 SCR MAMMO BI INCL CAD: CPT

## 2019-01-28 PROCEDURE — 76700 US EXAM ABDOM COMPLETE: CPT

## 2019-01-28 NOTE — TELEPHONE ENCOUNTER
Patient wants to let Dr. Arlene Nascimento know she is having her abdominal ultrasound today at 1pm, and her EMG is scheduled for 02/04/19 with Dr. Alexander English.

## 2019-01-29 NOTE — PROGRESS NOTES
The liver only showed some fatty liver. Some gallstones but no blockage. Fatty liver can be due to inflammation from eating a lot of refined carbs and fatty foods or processed foods.   Message sent to patient via [employer]:  January 28, 2019

## 2019-03-12 ENCOUNTER — OFFICE VISIT (OUTPATIENT)
Dept: INTERNAL MEDICINE CLINIC | Age: 64
End: 2019-03-12

## 2019-03-12 VITALS
BODY MASS INDEX: 23.95 KG/M2 | TEMPERATURE: 97.8 F | DIASTOLIC BLOOD PRESSURE: 78 MMHG | RESPIRATION RATE: 16 BRPM | WEIGHT: 152.6 LBS | OXYGEN SATURATION: 98 % | HEART RATE: 78 BPM | SYSTOLIC BLOOD PRESSURE: 110 MMHG | HEIGHT: 67 IN

## 2019-03-12 DIAGNOSIS — M79.10 MYALGIA: Primary | ICD-10-CM

## 2019-03-12 RX ORDER — PREDNISONE 20 MG/1
20 TABLET ORAL
Qty: 10 TAB | Refills: 0 | Status: SHIPPED | OUTPATIENT
Start: 2019-03-12 | End: 2022-08-01 | Stop reason: ALTCHOICE

## 2019-03-12 NOTE — PROGRESS NOTES
Leg Pain       HPI:  Yasmine Rosenbaum is a 58y.o. year old female who is here for a follow up visit. She was last seen by me on 1/8/2019. She reports the following:    Couldn't take clonazepam.  Made her hung over. But slept through the night    Still cramping throughout legs. Feet turned dusky. Legs get weak    Negative EMG. Neuro consultation negative    Blood tests all negative so far        Assessment and Plan        1. Myalgia  Informed pt that I am not sure where to go from here. We reviewed her history from the beginning and it appears it started all of a sudden after noticing a bite type kavin on her foot. Then legs would get weak on and off. Negative objective tests for muscle weakness  Suggest trial of prednisone to see if immune mediated. Inflammatory markers are negative. May need to revisit neuro or ortho for second opinion if no relief. - predniSONE (DELTASONE) 20 mg tablet; Take 20 mg by mouth daily (with breakfast). Dispense: 10 Tab;  Refill: 0            Visit Vitals  /78 (BP 1 Location: Left arm, BP Patient Position: Sitting)   Pulse 78   Temp 97.8 °F (36.6 °C) (Oral)   Resp 16   Ht 5' 7\" (1.702 m)   Wt 152 lb 9.6 oz (69.2 kg)   SpO2 98%   BMI 23.90 kg/m²       Historical Data    Past Medical History:   Diagnosis Date    Dysfunction of inner ear     History of splenectomy 10/3/2018    ITP (idiopathic thrombocytopenic purpura)     Age 12    Menopause     Multiple thyroid nodules     Dr. Wellington Lima    MVP (mitral valve prolapse)     Dr. Lisa Velasquez OA (osteoarthritis)     Osteopenia 7/19/16    Postmenopausal bleeding     neg eval by Dr. Jaren Michel Rheumatoid factor positive 07/19/2016    false positive, negative eval by Dr. Sapphire Bills Vitamin D insufficiency        Past Surgical History:   Procedure Laterality Date    HX COLONOSCOPY  12/7/07    normal, Dr. Gerhardt Sahara  12/7/12    R thyroid cyst FNA    HX SPLENECTOMY      HX TONSILLECTOMY Outpatient Encounter Medications as of 3/12/2019   Medication Sig Dispense Refill    predniSONE (DELTASONE) 20 mg tablet Take 20 mg by mouth daily (with breakfast). 10 Tab 0    cyanocobalamin, vitamin B-12, (VITAMIN B-12 PO) Take  by mouth.  Cholecalciferol, Vitamin D3, (VITAMIN D3) 2,000 unit cap capsule Take  by mouth daily.  VITAMIN B COMPLEX PO Take  by mouth.  multivitamin (ONE A DAY) tablet Take 1 Tab by mouth daily. 30 Tab 0    clonazePAM (KLONOPIN) 0.5 mg tablet Take 1 Tab by mouth nightly as needed. Max Daily Amount: 0.5 mg. 30 Tab 0    magnesium hydroxide (MAGNESIA PO) Take  by mouth daily.  rOPINIRole (REQUIP) 0.25 mg tablet Take 1 Tab by mouth nightly. 30 Tab 2     No facility-administered encounter medications on file as of 3/12/2019.          Allergies   Allergen Reactions    Zoloft [Sertraline] Other (comments)     Decreased appetite    Benzodiazepines Other (comments)      Clonzepam and Restoril  With sedation and fatigue    Lexapro [Escitalopram] Other (comments)     Decreased appetite, tachycardia, diaphoresis and increased anxiety    Prozac [Fluoxetine] Other (comments)     depression        Social History     Socioeconomic History    Marital status:      Spouse name: Not on file    Number of children: Not on file    Years of education: Not on file    Highest education level: Not on file   Social Needs    Financial resource strain: Not on file    Food insecurity - worry: Not on file    Food insecurity - inability: Not on file   [email or web address] needs - medical: Not on file   [email or web address] needs - non-medical: Not on file   Occupational History    Not on file   Tobacco Use    Smoking status: Never Smoker    Smokeless tobacco: Never Used   Substance and Sexual Activity    Alcohol use: Yes     Comment: occ    Drug use: No    Sexual activity: Yes     Partners: Male   Other Topics Concern    Not on file   Social History Narrative    Not on file family history includes Diabetes in her father; Heart Disease in her father; Hypertension in her father; No Known Problems in her mother; Stroke in her father. Review of Systems   Constitutional: Negative for chills, fever and weight loss. Eyes: Negative for blurred vision. Respiratory: Negative for shortness of breath. Cardiovascular: Negative for chest pain. Gastrointestinal: Negative for abdominal pain. Genitourinary: Negative for dysuria and frequency. Musculoskeletal: Positive for myalgias. Skin: Negative for itching and rash. Neurological: Positive for focal weakness. Negative for dizziness, weakness and headaches. Endo/Heme/Allergies: Negative for environmental allergies. Does not bruise/bleed easily. Physical Exam   Constitutional: She appears well-developed and well-nourished. She is active. Non-toxic appearance. She does not have a sickly appearance. She does not appear ill. No distress. Eyes: Conjunctivae are normal. Right eye exhibits no discharge. Neck: Carotid bruit is not present. No thyroid mass and no thyromegaly present. Cardiovascular: Normal rate, regular rhythm, S1 normal, S2 normal, normal heart sounds and normal pulses. Exam reveals no gallop and no friction rub. Pulmonary/Chest: Effort normal and breath sounds normal. No respiratory distress. Abdominal: Soft. Bowel sounds are normal.   Musculoskeletal: She exhibits no edema or deformity. Neurological: She is alert. Coordination normal.   Skin: Skin is warm and dry. No rash noted. No pallor. Psychiatric: She has a normal mood and affect. Her behavior is normal.   Vitals reviewed. Ortho Exam      Orders Placed This Encounter    predniSONE (DELTASONE) 20 mg tablet     Sig: Take 20 mg by mouth daily (with breakfast). Dispense:  10 Tab     Refill:  0        I have reviewed the patient's medical history in detail and updated the computerized patient record.      We had a prolonged discussion about these complex clinical issues and went over the various important aspects to consider. All questions were answered. Advised her to call back or return to office if symptoms do not improve, change in nature, or persist.    She was given an after visit summary or informed of [employer] Access which includes patient instructions, diagnoses, current medications, & vitals. She expressed understanding with the diagnosis and plan.

## 2019-03-12 NOTE — PROGRESS NOTES
Chief Complaint   Patient presents with    Leg Pain     Reviewed record in preparation for visit and have obtained necessary documentation. Identified pt with two pt identifiers(name and ).       Health Maintenance Due   Topic    MenB Meningococcal topic (1 of 2 - Risk Bexsero 2-dose series)    Pneumococcal 19-64 Highest Risk (2 of 3 - PCV13)    Shingrix Vaccine Age 50> (1 of 2)    COLONOSCOPY     PAP AKA CERVICAL CYTOLOGY          Chief Complaint   Patient presents with    Leg Pain        Wt Readings from Last 3 Encounters:   19 152 lb 9.6 oz (69.2 kg)   19 153 lb 12.8 oz (69.8 kg)   18 152 lb 9.6 oz (69.2 kg)     Temp Readings from Last 3 Encounters:   19 97.8 °F (36.6 °C) (Oral)   19 98.4 °F (36.9 °C) (Oral)   18 98.8 °F (37.1 °C) (Oral)     BP Readings from Last 3 Encounters:   19 110/78   19 140/86   18 111/79     Pulse Readings from Last 3 Encounters:   19 78   19 100   18 65           Learning Assessment:  :     Learning Assessment 2019 10/8/2018 10/2/2018 2016 2014   PRIMARY LEARNER Patient Patient Patient Patient Patient   HIGHEST LEVEL OF EDUCATION - PRIMARY LEARNER  - - 2 YEARS OF COLLEGE 4 YEARS OF COLLEGE 4 YEARS OF COLLEGE   BARRIERS PRIMARY LEARNER - - NONE NONE NONE   CO-LEARNER CAREGIVER - - - No No   PRIMARY LANGUAGE ENGLISH ENGLISH ENGLISH ENGLISH ENGLISH    NEED - - - - No   LEARNER PREFERENCE PRIMARY DEMONSTRATION DEMONSTRATION DEMONSTRATION DEMONSTRATION DEMONSTRATION   LEARNING SPECIAL TOPICS - - - - no   ANSWERED BY patient patient patient self patient     ' - - - -   RELATIONSHIP SELF SELF SELF SELF SELF   ASSESSMENT COMMENT - - - - [place]       Depression Screening:  :     3 most recent PHQ Screens 2019   Little interest or pleasure in doing things Several days   Feeling down, depressed, irritable, or hopeless Not at all   Total Score PHQ 2 1       Fall Risk Assessment:  :     Fall Risk Assessment, last 12 mths 1/8/2019   Able to walk? Yes   Fall in past 12 months? No       Abuse Screening:  :     Abuse Screening Questionnaire 1/8/2019 10/2/2018 4/12/2016 8/11/2014   Do you ever feel afraid of your partner? N N N N   Are you in a relationship with someone who physically or mentally threatens you? N N N N   Is it safe for you to go home? Y Y Y Y       Coordination of Care Questionnaire:  :     1) Have you been to an emergency room, urgent care clinic since your last visit? no   Hospitalized since your last visit? no             2) Have you seen or consulted any other health care providers outside of [address] [place] [address] since your last visit? no  (Include any pap smears or colon screenings in this section.)    3) Do you have an Advance Directive on file? no    4) Are you interested in receiving information on Advance Directives? NO      Patient is accompanied by self I have received verbal consent from Carey Serrano to discuss any/all medical information while they are present in the room. Reviewed record  In preparation for visit and have obtained necessary documentation.

## 2020-02-27 ENCOUNTER — HOSPITAL ENCOUNTER (OUTPATIENT)
Dept: MAMMOGRAPHY | Age: 65
Discharge: HOME | End: 2020-02-27
Attending: OBSTETRICS & GYNECOLOGY
Payer: MEDICARE

## 2020-02-27 DIAGNOSIS — Z12.31 VISIT FOR SCREENING MAMMOGRAM: ICD-10-CM

## 2020-02-27 PROCEDURE — 77063 BREAST TOMOSYNTHESIS BI: CPT

## 2021-11-29 ENCOUNTER — OFFICE VISIT (OUTPATIENT)
Dept: ORTHOPEDIC SURGERY | Age: 66
End: 2021-11-29
Payer: MEDICARE

## 2021-11-29 VITALS — HEIGHT: 68 IN | BODY MASS INDEX: 26.52 KG/M2 | WEIGHT: 175 LBS

## 2021-11-29 DIAGNOSIS — M47.816 LUMBAR SPONDYLOSIS: Primary | ICD-10-CM

## 2021-11-29 DIAGNOSIS — M51.36 DDD (DEGENERATIVE DISC DISEASE), LUMBAR: ICD-10-CM

## 2021-11-29 PROCEDURE — 1101F PT FALLS ASSESS-DOCD LE1/YR: CPT | Performed by: ORTHOPAEDIC SURGERY

## 2021-11-29 PROCEDURE — 99214 OFFICE O/P EST MOD 30 MIN: CPT | Performed by: ORTHOPAEDIC SURGERY

## 2021-11-29 PROCEDURE — 1090F PRES/ABSN URINE INCON ASSESS: CPT | Performed by: ORTHOPAEDIC SURGERY

## 2021-11-29 PROCEDURE — G8427 DOCREV CUR MEDS BY ELIG CLIN: HCPCS | Performed by: ORTHOPAEDIC SURGERY

## 2021-11-29 PROCEDURE — 3017F COLORECTAL CA SCREEN DOC REV: CPT | Performed by: ORTHOPAEDIC SURGERY

## 2021-11-29 PROCEDURE — G8432 DEP SCR NOT DOC, RNG: HCPCS | Performed by: ORTHOPAEDIC SURGERY

## 2021-11-29 PROCEDURE — G8419 CALC BMI OUT NRM PARAM NOF/U: HCPCS | Performed by: ORTHOPAEDIC SURGERY

## 2021-11-29 PROCEDURE — G8536 NO DOC ELDER MAL SCRN: HCPCS | Performed by: ORTHOPAEDIC SURGERY

## 2021-11-29 PROCEDURE — G9899 SCRN MAM PERF RSLTS DOC: HCPCS | Performed by: ORTHOPAEDIC SURGERY

## 2021-11-29 PROCEDURE — G8399 PT W/DXA RESULTS DOCUMENT: HCPCS | Performed by: ORTHOPAEDIC SURGERY

## 2021-11-29 RX ORDER — MELOXICAM 15 MG/1
15 TABLET ORAL DAILY
Qty: 30 TABLET | Refills: 0 | Status: SHIPPED | OUTPATIENT
Start: 2021-11-29

## 2021-11-29 NOTE — PROGRESS NOTES
Katelyn Arnold (: 1955) is a 78 y.o. female, patient, here for evaluation of the following chief complaint(s):  Hip Pain and Back Pain       SUBJECTIVE/OBJECTIVE:  Katelyn Arnold presents today complaining of diffuse low back pain and gluteal pain, bilateral.  I last saw her in May of this year. At that time I thought she was having some overlapping symptoms from the lumbar spine, as well as mild arthritis in the left hip. Today she has no anterior hip or groin pain. Relates some mild trochanteric symptoms in both hips, when she lies on both sides, but otherwise no true hip symptoms. She is reasonably comfortable at rest during the day. Has difficulty bending. All of her back pain and gluteal pain is exacerbated with activities. Pain at start up. Aching pain all night. Worse when she rolls over a certain way in bed. Denies distal radicular symptoms. No focal numbness weakness or tingling. She went through an extensive course of physical therapy this summer, as well as 30-day course prescription anti-inflammatories. Really had no relief. PHYSICAL EXAM:  Vitals: Ht 5' 7.5\" (1.715 m)   Wt 175 lb (79.4 kg)   BMI 27.00 kg/m²   Body mass index is 27 kg/m². 78y.o. year old F, no distress. Ambulates without limp or Trendelenburg gait. Has tightness and discomfort with extension of lumbar spine, more pain with flexion. No paraspinal spasm or tenderness. No nerve tension signs. Mild trochanteric tenderness over both hips. Negative Stinchfield bilaterally. Well-preserved bilateral hip range of motion without groin pain. Motor power 5/5 throughout lower extremities. Symmetrical reflexes knees and ankles. No sensory deficits. No distal edema. Symmetrical distal pulses. IMAGING:  Radiographs: Previous x-rays of the left hip demonstrate minimal narrowing of left hip joint space. Evidence of lumbar spondylosis and degenerative disc disease with scoliosis.     Previous lumbar spine MRI at [facility] for 3 years ago demonstrated multilevel degenerative changes. Mild foraminal stenosis on the left side at L3-4. Mild scoliosis. ASSESSMENT/PLAN:  1. Lumbar spondylosis  -     meloxicam (Mobic) 15 mg tablet; Take 1 Tablet by mouth daily. , Normal, Disp-30 Tablet, R-0  2. DDD (degenerative disc disease), lumbar    The xray and exam findings were discussed with the patient today. All of her presenting symptoms today are referred from the lumbar spine. She is having no radicular symptoms. I know I have anything else to offer her. She is a little bit frustrated. We will try a short course of a different nonsteroidal anti-inflammatory, and she plans to follow-up with one of my spine colleagues. Return if symptoms worsen or fail to improve. Review Of Systems  ROS     Positive for: Musculoskeletal    Last edited by Lyla Zurita RN on 11/29/2021  2:57 PM. (History)         Patient denies any recent fever, chills, nausea, vomiting, chest pain, or shortness of breath. Allergies   Allergen Reactions    Zoloft [Sertraline] Other (comments)     Decreased appetite    Benzodiazepines Other (comments)      Clonzepam and Restoril  With sedation and fatigue    Lexapro [Escitalopram] Other (comments)     Decreased appetite, tachycardia, diaphoresis and increased anxiety    Prozac [Fluoxetine] Other (comments)     depression       Current Outpatient Medications   Medication Sig    meloxicam (Mobic) 15 mg tablet Take 1 Tablet by mouth daily.  predniSONE (DELTASONE) 20 mg tablet Take 20 mg by mouth daily (with breakfast).  cyanocobalamin, vitamin B-12, (VITAMIN B-12 PO) Take  by mouth.  clonazePAM (KLONOPIN) 0.5 mg tablet Take 1 Tab by mouth nightly as needed. Max Daily Amount: 0.5 mg.    magnesium hydroxide (MAGNESIA PO) Take  by mouth daily.  rOPINIRole (REQUIP) 0.25 mg tablet Take 1 Tab by mouth nightly.     Cholecalciferol, Vitamin D3, (VITAMIN D3) 2,000 unit cap capsule Take  by mouth daily.  VITAMIN B COMPLEX PO Take  by mouth.  multivitamin (ONE A DAY) tablet Take 1 Tab by mouth daily. No current facility-administered medications for this visit.        Past Medical History:   Diagnosis Date    Dysfunction of inner ear     History of splenectomy 10/3/2018    ITP (idiopathic thrombocytopenic purpura)     Age 12    Menopause     Multiple thyroid nodules     Dr. Apple Arnold MVP (mitral valve prolapse)     Dr. Andres Roland OA (osteoarthritis)     Osteopenia 7/19/16    Postmenopausal bleeding     neg eval by Dr. Betina Haq Rheumatoid factor positive 07/19/2016    false positive, negative eval by Dr. Marcella Broussard Vitamin D insufficiency         Past Surgical History:   Procedure Laterality Date    HX COLONOSCOPY  12/7/07    normal, Dr. Jose Roberto Schultz  12/7/12    R thyroid cyst FNA    HX SPLENECTOMY      HX TONSILLECTOMY         Family History   Problem Relation Age of Onset    Diabetes Father     Heart Disease Father         CABG 63's    Hypertension Father     Stroke Father     No Known Problems Mother         Social History     Socioeconomic History    Marital status:      Spouse name: Not on file    Number of children: Not on file    Years of education: Not on file    Highest education level: Not on file   Occupational History    Not on file   Tobacco Use    Smoking status: Never Smoker    Smokeless tobacco: Never Used   Substance and Sexual Activity    Alcohol use: Yes     Comment: occ    Drug use: No    Sexual activity: Yes     Partners: Male   Other Topics Concern    Not on file   Social History Narrative    Not on file     Social Determinants of Health     Financial Resource Strain:     Difficulty of Paying Living Expenses: Not on file   Food Insecurity:     Worried About Running Out of Food in the Last Year: Not on file    [place] of Food in the Last Year: Not on file   Transportation Needs:     Lack of Transportation (Medical): Not on file    Lack of Transportation (Non-Medical): Not on file   Physical Activity:     Days of Exercise per Week: Not on file    Minutes of Exercise per Session: Not on file   Stress:     Feeling of Stress : Not on file   Social Connections:     Frequency of Communication with Friends and Family: Not on file    Frequency of Social Gatherings with Friends and Family: Not on file    Attends [employer] Services: Not on file    Active Member of [address] [place] [address] or Organizations: Not on file    Attends Club or Organization Meetings: Not on file    Marital Status: Not on file   Intimate Partner Violence:     Fear of Current or Ex-Partner: Not on file    Emotionally Abused: Not on file    Physically Abused: Not on file    Sexually Abused: Not on file   Housing Stability:     Unable to Pay for Housing in the Last Year: Not on file    Number of [place] in the Last Year: Not on file    Unstable Housing in the Last Year: Not on file       Orders Placed This Encounter    meloxicam (Mobic) 15 mg tablet     Sig: Take 1 Tablet by mouth daily. Dispense:  30 Tablet     Refill:  0        An electronic signature was used to authenticate this note.   -- Kimmy Richard MD

## 2021-12-09 ENCOUNTER — TELEPHONE (OUTPATIENT)
Dept: RHEUMATOLOGY | Age: 66
End: 2021-12-09

## 2021-12-09 NOTE — TELEPHONE ENCOUNTER
Patient returned call and is upset and disappointed that her appointment for today was cancelled same day. The only consolation is that she had not yet left her home an hour away. Patient states that she has waited two months for this appointment and to get into a queue again to wait 7 additional months for the next new patient appointment is just not right. I told the patient how sorry we are that this error was made by our people, and that I would message our practice supervisor to see if there is anything that we can do to get her in sooner than July.

## 2022-03-18 PROBLEM — Z90.81 HISTORY OF SPLENECTOMY: Status: ACTIVE | Noted: 2018-10-03

## 2022-08-01 ENCOUNTER — OFFICE VISIT (OUTPATIENT)
Dept: ORTHOPEDIC SURGERY | Age: 67
End: 2022-08-01
Payer: MEDICARE

## 2022-08-01 VITALS — WEIGHT: 175 LBS | BODY MASS INDEX: 26.52 KG/M2 | HEIGHT: 68 IN

## 2022-08-01 DIAGNOSIS — M17.11 PRIMARY OSTEOARTHRITIS OF RIGHT KNEE: Primary | ICD-10-CM

## 2022-08-01 DIAGNOSIS — M25.461 EFFUSION OF RIGHT KNEE: ICD-10-CM

## 2022-08-01 DIAGNOSIS — M25.561 RIGHT KNEE PAIN, UNSPECIFIED CHRONICITY: ICD-10-CM

## 2022-08-01 PROCEDURE — 1090F PRES/ABSN URINE INCON ASSESS: CPT | Performed by: PHYSICIAN ASSISTANT

## 2022-08-01 PROCEDURE — 3017F COLORECTAL CA SCREEN DOC REV: CPT | Performed by: PHYSICIAN ASSISTANT

## 2022-08-01 PROCEDURE — G8417 CALC BMI ABV UP PARAM F/U: HCPCS | Performed by: PHYSICIAN ASSISTANT

## 2022-08-01 PROCEDURE — G8536 NO DOC ELDER MAL SCRN: HCPCS | Performed by: PHYSICIAN ASSISTANT

## 2022-08-01 PROCEDURE — 1101F PT FALLS ASSESS-DOCD LE1/YR: CPT | Performed by: PHYSICIAN ASSISTANT

## 2022-08-01 PROCEDURE — 20610 DRAIN/INJ JOINT/BURSA W/O US: CPT | Performed by: PHYSICIAN ASSISTANT

## 2022-08-01 PROCEDURE — G8399 PT W/DXA RESULTS DOCUMENT: HCPCS | Performed by: PHYSICIAN ASSISTANT

## 2022-08-01 PROCEDURE — G8432 DEP SCR NOT DOC, RNG: HCPCS | Performed by: PHYSICIAN ASSISTANT

## 2022-08-01 PROCEDURE — 1123F ACP DISCUSS/DSCN MKR DOCD: CPT | Performed by: PHYSICIAN ASSISTANT

## 2022-08-01 PROCEDURE — G8427 DOCREV CUR MEDS BY ELIG CLIN: HCPCS | Performed by: PHYSICIAN ASSISTANT

## 2022-08-01 PROCEDURE — 99213 OFFICE O/P EST LOW 20 MIN: CPT | Performed by: PHYSICIAN ASSISTANT

## 2022-08-01 RX ORDER — CELECOXIB 100 MG/1
100 CAPSULE ORAL 2 TIMES DAILY
COMMUNITY
Start: 2022-06-29

## 2022-08-01 RX ORDER — BUPIVACAINE HYDROCHLORIDE 2.5 MG/ML
5 INJECTION, SOLUTION INFILTRATION; PERINEURAL ONCE
Status: COMPLETED | OUTPATIENT
Start: 2022-08-01 | End: 2022-08-01

## 2022-08-01 RX ORDER — TRIAMCINOLONE ACETONIDE 40 MG/ML
40 INJECTION, SUSPENSION INTRA-ARTICULAR; INTRAMUSCULAR ONCE
Status: COMPLETED | OUTPATIENT
Start: 2022-08-01 | End: 2022-08-01

## 2022-08-01 RX ADMIN — TRIAMCINOLONE ACETONIDE 40 MG: 40 INJECTION, SUSPENSION INTRA-ARTICULAR; INTRAMUSCULAR at 11:36

## 2022-08-01 RX ADMIN — BUPIVACAINE HYDROCHLORIDE 12.5 MG: 2.5 INJECTION, SOLUTION INFILTRATION; PERINEURAL at 11:36

## 2022-08-01 NOTE — LETTER
8/4/2022    Patient: Shabnam Preston   YOB: 1955   Date of Visit: 8/1/2022     Jaycee Yousif, [facility],[address]  [address] [phone]  Via Fax: [phone]    Dear Jaycee Yousif MD,      Thank you for referring Ms. Alethea Dancer to [facility] for evaluation. My notes for this consultation are attached. If you have questions, please do not hesitate to call me. I look forward to following your patient along with you.       Sincerely,    Marty Romero MD

## 2022-08-01 NOTE — PROGRESS NOTES
Nikkie Gordon (: 1955) is a 75 y.o. female, patient, here for evaluation of the following chief complaint(s):  Knee Pain (right)       SUBJECTIVE/OBJECTIVE:  Nikkie Gordon presents today complaining of right knee pain and swelling. She went to the gym Saturday as usual, and  she woke up with significant pain and swelling that occurred acutely. 1 episode of aspiration and injection approximately 5 years ago. Pain is diffuse without radiation. She is also been evaluated by rheumatologist.  Lidia Showers out polymyalgia rheumatica, RA, etc.  She has been on Celebrex 100 mg twice a day for a month without much relief. We saw her in 2021 for unrelated hip and back pain. PHYSICAL EXAM:  Vitals: Ht 5' 7.5\" (1.715 m)   Wt 175 lb (79.4 kg)   BMI 27.00 kg/m²   Body mass index is 27 kg/m². 75y.o. year old F in no acute distress. Ambulates with a limp on the right, no assistive device. Neutral alignment of the right knee. 1+ effusion. Skin otherwise warm, dry. Difficulty with range of motion today secondary to pain and swelling. Motor 5/5. No distal edema. IMAGING:  Radiographs: XR Results (most recent):  Results from Appointment encounter on 22    XR KNEE RT MIN 4 V    Narrative  Four views (AP, PA-flex, lateral & sunrise) of the right knee were taken and reviewed today using digital radiography which reveal mild medial joint space loss, medial and lateral chondrocalcinosis present. Severe PF OA, with complete loss laterally in trochlear groove. ASSESSMENT/PLAN:  1. Primary osteoarthritis of right knee  -     bupivacaine HCl (MARCAINE) 0.25 % (2.5 mg/mL) injection 12.5 mg; 12.5 mg (5 mL), Other, ONCE, 1 dose, On 22 at 1200  -     triamcinolone acetonide (KENALOG-40) 40 mg/mL injection 40 mg; 40 mg, Intra artICUlar, ONCE, 1 dose, On 22 at 1200  2. Effusion of right knee  3.  Right knee pain, unspecified chronicity  -     XR KNEE RT MIN 4 V; Future    The xray and exam findings were discussed with the patient today. Acute exacerbation of right knee osteoarthritis complicated by effusion. Discussed risks/benefits of conservative vs. operative interventions at this time to include NSAIDs, PT, cortisone injections, and surgery. She is already on Celebrex from rheumatology. She elects for aspiration and cortisone injection today. Discussed continuing physician directed home exercise program at the gym, and exercises to avoid including leg extensions, deep squats, and lunges. Discussed natural disease progression and possible need for surgery in the future. Follow-up as needed. Under sterile conditions, the knee as anesthetized with 2cc 0.25% bupivacaine with a 22g needle. Knee was then aspirated using an 18g needle, 40 cc of fluid removed, and knee injected with 5cc 0.25% Sensorcaine and 1cc 40mg Kenalog, tolerated the procedure well. Return if symptoms worsen or fail to improve. Review Of Systems  ROS    Positive for: Musculoskeletal  Last edited by Richard Burch on 8/1/2022 10:55 AM.         Patient denies any recent fever, chills, nausea, vomiting, chest pain, or shortness of breath. Allergies   Allergen Reactions    Zoloft [Sertraline] Other (comments)     Decreased appetite    Benzodiazepines Other (comments)      Clonzepam and Restoril  With sedation and fatigue    Lexapro [Escitalopram] Other (comments)     Decreased appetite, tachycardia, diaphoresis and increased anxiety    Prozac [Fluoxetine] Other (comments)     depression       Current Outpatient Medications   Medication Sig    celecoxib (CELEBREX) 100 mg capsule Take 100 mg by mouth two (2) times a day. cholecalciferol (VITAMIN D3) (2,000 UNITS /50 MCG) cap capsule Take  by mouth daily. multivitamin (ONE A DAY) tablet Take 1 Tab by mouth daily. meloxicam (Mobic) 15 mg tablet Take 1 Tablet by mouth daily.  (Patient not taking: Reported on 8/1/2022) cyanocobalamin, vitamin B-12, (VITAMIN B-12 PO) Take  by mouth. (Patient not taking: Reported on 8/1/2022)    clonazePAM (KLONOPIN) 0.5 mg tablet Take 1 Tab by mouth nightly as needed. Max Daily Amount: 0.5 mg. (Patient not taking: Reported on 8/1/2022)    magnesium hydroxide (MAGNESIA PO) Take  by mouth daily. (Patient not taking: Reported on 8/1/2022)    rOPINIRole (REQUIP) 0.25 mg tablet Take 1 Tab by mouth nightly. (Patient not taking: Reported on 8/1/2022)    VITAMIN B COMPLEX PO Take  by mouth. (Patient not taking: Reported on 8/1/2022)     No current facility-administered medications for this visit.        Past Medical History:   Diagnosis Date    Dysfunction of inner ear     History of splenectomy 10/3/2018    ITP (idiopathic thrombocytopenic purpura)     Age 12    Menopause     Multiple thyroid nodules     Dr. Lorena Ann    MVP (mitral valve prolapse)     Dr. Fabi Garcia    OA (osteoarthritis)     Osteopenia 7/19/16    Postmenopausal bleeding     neg eval by Dr. Jarrell Laurent    Rheumatoid factor positive 07/19/2016    false positive, negative eval by Dr. Funk Seek    Vitamin D insufficiency         Past Surgical History:   Procedure Laterality Date    HX COLONOSCOPY  12/7/07    normal, Dr. Linda Salas  12/7/12    R thyroid cyst FNA    HX SPLENECTOMY      HX TONSILLECTOMY         Family History   Problem Relation Age of Onset    Diabetes Father     Heart Disease Father         CABG 63's    Hypertension Father     Stroke Father     No Known Problems Mother         Social History     Socioeconomic History    Marital status:      Spouse name: Not on file    Number of children: Not on file    Years of education: Not on file    Highest education level: Not on file   Occupational History    Not on file   Tobacco Use    Smoking status: Never    Smokeless tobacco: Never   Substance and Sexual Activity    Alcohol use: Yes     Comment: occ    Drug use: No    Sexual activity: Yes     Partners: Male   Other Topics Concern    Not on file   Social History Narrative    Not on file     Social Determinants of Health     Financial Resource Strain: Not on file   Food Insecurity: Not on file   Transportation Needs: Not on file   Physical Activity: Not on file   Stress: Not on file   Social Connections: Not on file   Intimate Partner Violence: Not on file   Housing Stability: Not on file       Orders Placed This Encounter    XR KNEE RT MIN 4 V     Standing Status:   Future     Number of Occurrences:   1     Standing Expiration Date:   8/2/2023    bupivacaine HCl (MARCAINE) 0.25 % (2.5 mg/mL) injection 12.5 mg    triamcinolone acetonide (KENALOG-40) 40 mg/mL injection 40 mg      Hung Varghese M.D. was available for immediate consultation as the supervising physician. An electronic signature was used to authenticate this note.   -- Paula Lal PA-C

## 2023-10-09 ENCOUNTER — HOSPITAL ENCOUNTER (OUTPATIENT)
Age: 68
Discharge: HOME | End: 2023-10-12
Payer: MEDICARE

## 2023-10-09 DIAGNOSIS — M41.9 SCOLIOSIS OF LUMBAR SPINE, UNSPECIFIED SCOLIOSIS TYPE: ICD-10-CM

## 2023-10-09 PROCEDURE — 72148 MRI LUMBAR SPINE W/O DYE: CPT

## 2023-11-02 ENCOUNTER — HOSPITAL ENCOUNTER (OUTPATIENT)
Facility: HOSPITAL | Age: 68
Discharge: HOME | End: 2023-11-02
Attending: STUDENT IN AN ORGANIZED HEALTH CARE EDUCATION/TRAINING PROGRAM | Admitting: STUDENT IN AN ORGANIZED HEALTH CARE EDUCATION/TRAINING PROGRAM
Payer: MEDICARE

## 2023-11-02 VITALS
RESPIRATION RATE: 23 BRPM | TEMPERATURE: 98 F | BODY MASS INDEX: 26.52 KG/M2 | SYSTOLIC BLOOD PRESSURE: 98 MMHG | HEART RATE: 90 BPM | HEIGHT: 68 IN | DIASTOLIC BLOOD PRESSURE: 75 MMHG | WEIGHT: 175 LBS | OXYGEN SATURATION: 92 %

## 2023-11-02 DIAGNOSIS — M80.08XA AGE-RELATED OSTEOPOROSIS WITH CURRENT PATHOLOGICAL FRACTURE, VERTEBRA(E), INITIAL ENCOUNTER FOR FRACTURE (HCC): ICD-10-CM

## 2023-11-02 PROCEDURE — 6360000002 HC RX W HCPCS: Performed by: STUDENT IN AN ORGANIZED HEALTH CARE EDUCATION/TRAINING PROGRAM

## 2023-11-02 PROCEDURE — 2580000003 HC RX 258: Performed by: STUDENT IN AN ORGANIZED HEALTH CARE EDUCATION/TRAINING PROGRAM

## 2023-11-02 PROCEDURE — C1713 ANCHOR/SCREW BN/BN,TIS/BN: HCPCS

## 2023-11-02 PROCEDURE — 2500000003 HC RX 250 WO HCPCS: Performed by: STUDENT IN AN ORGANIZED HEALTH CARE EDUCATION/TRAINING PROGRAM

## 2023-11-02 RX ORDER — LIDOCAINE HYDROCHLORIDE 10 MG/ML
INJECTION, SOLUTION EPIDURAL; INFILTRATION; INTRACAUDAL; PERINEURAL PRN
Status: COMPLETED | OUTPATIENT
Start: 2023-11-02 | End: 2023-11-02

## 2023-11-02 RX ORDER — FENTANYL CITRATE 50 UG/ML
INJECTION, SOLUTION INTRAMUSCULAR; INTRAVENOUS PRN
Status: COMPLETED | OUTPATIENT
Start: 2023-11-02 | End: 2023-11-02

## 2023-11-02 RX ORDER — SODIUM CHLORIDE 9 MG/ML
INJECTION, SOLUTION INTRAVENOUS CONTINUOUS PRN
Status: COMPLETED | OUTPATIENT
Start: 2023-11-02 | End: 2023-11-02

## 2023-11-02 RX ORDER — DIPHENHYDRAMINE HYDROCHLORIDE 50 MG/ML
INJECTION INTRAMUSCULAR; INTRAVENOUS PRN
Status: COMPLETED | OUTPATIENT
Start: 2023-11-02 | End: 2023-11-02

## 2023-11-02 RX ORDER — KETOROLAC TROMETHAMINE 30 MG/ML
INJECTION, SOLUTION INTRAMUSCULAR; INTRAVENOUS PRN
Status: COMPLETED | OUTPATIENT
Start: 2023-11-02 | End: 2023-11-02

## 2023-11-02 RX ORDER — BUPIVACAINE HYDROCHLORIDE 5 MG/ML
INJECTION, SOLUTION EPIDURAL; INTRACAUDAL PRN
Status: COMPLETED | OUTPATIENT
Start: 2023-11-02 | End: 2023-11-02

## 2023-11-02 RX ORDER — MIDAZOLAM HYDROCHLORIDE 2 MG/2ML
INJECTION, SOLUTION INTRAMUSCULAR; INTRAVENOUS PRN
Status: COMPLETED | OUTPATIENT
Start: 2023-11-02 | End: 2023-11-02

## 2023-11-02 RX ORDER — ACETAMINOPHEN 500 MG
500 TABLET ORAL EVERY 6 HOURS PRN
COMMUNITY

## 2023-11-02 RX ADMIN — FENTANYL CITRATE 100 MCG: 50 INJECTION, SOLUTION INTRAMUSCULAR; INTRAVENOUS at 11:36

## 2023-11-02 RX ADMIN — KETOROLAC TROMETHAMINE 15 MG: 30 INJECTION, SOLUTION INTRAMUSCULAR at 11:28

## 2023-11-02 RX ADMIN — MIDAZOLAM HYDROCHLORIDE 2 MG: 1 INJECTION, SOLUTION INTRAMUSCULAR; INTRAVENOUS at 11:36

## 2023-11-02 RX ADMIN — SODIUM CHLORIDE 125 ML/HR: 9 INJECTION, SOLUTION INTRAVENOUS at 11:27

## 2023-11-02 RX ADMIN — BUPIVACAINE HYDROCHLORIDE 10 ML: 5 INJECTION, SOLUTION EPIDURAL; INTRACAUDAL; PERINEURAL at 11:47

## 2023-11-02 RX ADMIN — WATER 2000 MG: 1 INJECTION INTRAMUSCULAR; INTRAVENOUS; SUBCUTANEOUS at 11:28

## 2023-11-02 RX ADMIN — LIDOCAINE HYDROCHLORIDE 4 ML: 10 INJECTION, SOLUTION EPIDURAL; INFILTRATION; INTRACAUDAL; PERINEURAL at 11:47

## 2023-11-02 RX ADMIN — DIPHENHYDRAMINE HYDROCHLORIDE 25 MG: 50 INJECTION, SOLUTION INTRAMUSCULAR; INTRAVENOUS at 11:28

## 2023-11-02 ASSESSMENT — PULMONARY FUNCTION TESTS
PIF_VALUE: 0

## 2023-11-02 NOTE — PROGRESS NOTES
Name of procedure:  sacroplasty     Sedation medications given: versed 2mg, fentanyl 100mcg     Sedation tolerated: well     Total Procedure time: 12min     Vital Signs: stable     Any complications related to procedure: see orders     Post Procedure Care Needed/order sets placed in connect care: see orders     Pt tolerated procedure well. VSS. No C/O pain. Dressing to site D&I. No bleeding or hematoma noted to site. IV D/Cd. Discharge instructions given. Copy on chart and copy given to pt. Pt verbalized understanding. Pt taken to car by wheelchair and taken home by family. NAD noted at time of discharge.

## 2023-11-02 NOTE — PROGRESS NOTES
Pt cleared and appropriate for discharge. Pt is awake and alert and VS are at baseline. Responsible  contacted for discharge. All discharge instructions  have been verbally reviewed and all questions answered with understanding. Written instructions provided.

## 2023-11-02 NOTE — DISCHARGE INSTRUCTIONS
You have received sedation medications today. YOU SHOULD NOT DRIVE FOR 24 HOURS, DO NOT OPERATE HEAVY MACHINERY, DO NOT MAKE ANY LEGAL DECISIONS OR SIGN LEGAL DOCUMENTS FOR 24 HOURS. DO NOT DRINK ALCOHOL, TAKE ANY MEDICATIONS UNLESS PRESCRIBED BY YOUR DOCTOR. IF YOU ARE A CAREGIVER, SOMEONE SHOULD TAKE THAT ROLE FOR 24 HOURS. Side effects of sedation medications and other medications used today have been reviewed  Those side effects can include but are not limited to: dizziness, drowsiness, poor balance, fatigue, sleepiness. Take precautions at home to prevent falls, such as assistance with walking or stairs if allowed and /or when needed or position changes. Allergic or adverse reactions could include nausea, itching, hives, dizziness, or anything else out of the ordinary. Should you experience any of these significant changes, please call [phone] between the hours of 7:30 am and 3:30 pm or [phone] after hours. After hours, ask the  to page the X-ray Technologist, and describe the problem to the technologist. If you are experiencing chest pain, shortness of breath, altered mental state, unusual bleeding or any other emergent symptom you should call 911 immediately. Kyphoplasty: What to Expect at [address]  After kyphoplasty to relieve pain from compression fractures, your back may feel sore where the hollow needle (trocar) went into your back. This should go away in a few days. Most people are able to return to their daily activities within a day. This care sheet gives you a general idea about how long it will take for you to recover. But each person recovers at a different pace. Follow the steps below to get better as quickly as possible. How can you care for yourself at home? Activity    Take it easy for the first 24 hours. Rest when you feel tired. Getting enough sleep will help you recover.      For the first day after the procedure, avoid lifting anything

## 2023-11-02 NOTE — PROGRESS NOTES
Pt arrives ambulatory to angio department accompanied by  for sacroplasty procedure. All assessments completed and consent was reviewed. Education given was regarding procedure, moderate sedation, post-procedure care and  management/follow-up. Opportunity for questions was provided and all questions and concerns were addressed.

## 2024-05-23 NOTE — PROGRESS NOTES
Price (Use Numbers Only, No Special Characters Or $): 0 Reviewed record in preparation for visit and have obtained necessary documentation. Identified pt with two pt identifiers(name and ). Health Maintenance Due Topic  Shingrix Vaccine Age 50> (1 of 2)  COLONOSCOPY  Influenza Age 5 to Adult  PAP AKA CERVICAL CYTOLOGY Chief Complaint Patient presents with [address] [place] [address] Wt Readings from Last 3 Encounters:  
10/02/18 153 lb 9.6 oz (69.7 kg) 16 156 lb (70.8 kg) 10/10/16 159 lb (72.1 kg) Temp Readings from Last 3 Encounters:  
16 98 °F (36.7 °C) (Oral) 16 98.7 °F (37.1 °C) (Oral) 16 97.9 °F (36.6 °C) (Oral) BP Readings from Last 3 Encounters:  
16 130/82  
10/10/16 115/78  
16 116/68 Pulse Readings from Last 3 Encounters:  
16 63  
10/10/16 71  
16 70 Learning Assessment: 
:  
 
Learning Assessment 10/2/2018 2016 2014 PRIMARY LEARNER Patient Patient Patient HIGHEST LEVEL OF EDUCATION - PRIMARY LEARNER  2 YEARS OF COLLEGE 4 YEARS OF COLLEGE 4 YEARS OF COLLEGE  
BARRIERS PRIMARY LEARNER NONE NONE NONE  
CO-LEARNER CAREGIVER - No No  
PRIMARY LANGUAGE ENGLISH ENGLISH ENGLISH  NEED - - No  
LEARNER PREFERENCE PRIMARY DEMONSTRATION DEMONSTRATION DEMONSTRATION  
LEARNING SPECIAL TOPICS - - no ANSWERED BY patient self patient RELATIONSHIP SELF SELF SELF  
ASSESSMENT COMMENT - - [place] Depression Screening: 
:  
 
PHQ over the last two weeks 10/2/2018 Little interest or pleasure in doing things Not at all Feeling down, depressed, irritable, or hopeless Not at all Total Score PHQ 2 0 Fall Risk Assessment: 
:  
 
Fall Risk Assessment, last 12 mths 10/2/2018 Able to walk? Yes Fall in past 12 months? No  
 
 
Abuse Screening: 
:  
 
Abuse Screening Questionnaire 10/2/2018 2016 2014 Do you ever feel afraid of your partner? N N N Are you in a relationship with someone who physically or mentally Comments (Free Text): Plan to add a little more to left medial cheek in 1 mo with sample. It overall looks great! threatens you? N N N Is it safe for you to go home? Pily Lisa Coordination of Care Questionnaire: 
:  
 
1) Have you been to an emergency room, urgent care clinic since your last visit? no  
Hospitalized since your last visit? no          
 
2) Have you seen or consulted any other health care providers outside of [address] [place] [address] since your last visit? no (Include any pap smears or colon screenings in this section.) 3) Do you have an Advance Directive on file? no 
 
4) Are you interested in receiving information on Advance Directives? NO Patient is accompanied by self I have received verbal consent from Eron Mccann to discuss any/all medical information while they are present in the room. Detail Level: Zone

## 2024-06-25 ENCOUNTER — HOSPITAL ENCOUNTER (OUTPATIENT)
Facility: HOSPITAL | Age: 69
Discharge: HOME | End: 2024-06-28
Attending: ORTHOPAEDIC SURGERY
Payer: MEDICARE

## 2024-06-25 DIAGNOSIS — M16.12 PRIMARY OSTEOARTHRITIS OF LEFT HIP: ICD-10-CM

## 2024-06-25 PROCEDURE — 6360000004 HC RX CONTRAST MEDICATION: Performed by: RADIOLOGY

## 2024-06-25 PROCEDURE — 6360000002 HC RX W HCPCS: Performed by: RADIOLOGY

## 2024-06-25 PROCEDURE — 20610 DRAIN/INJ JOINT/BURSA W/O US: CPT

## 2024-06-25 PROCEDURE — 2500000003 HC RX 250 WO HCPCS: Performed by: RADIOLOGY

## 2024-06-25 RX ORDER — BUPIVACAINE HYDROCHLORIDE 5 MG/ML
30 INJECTION, SOLUTION EPIDURAL; INTRACAUDAL ONCE
Status: COMPLETED | OUTPATIENT
Start: 2024-06-25 | End: 2024-06-25

## 2024-06-25 RX ORDER — TRIAMCINOLONE ACETONIDE 40 MG/ML
40 INJECTION, SUSPENSION INTRA-ARTICULAR; INTRAMUSCULAR ONCE
Status: COMPLETED | OUTPATIENT
Start: 2024-06-25 | End: 2024-06-25

## 2024-06-25 RX ORDER — LIDOCAINE HYDROCHLORIDE 10 MG/ML
5 INJECTION, SOLUTION EPIDURAL; INFILTRATION; INTRACAUDAL; PERINEURAL ONCE
Status: COMPLETED | OUTPATIENT
Start: 2024-06-25 | End: 2024-06-25

## 2024-06-25 RX ADMIN — LIDOCAINE HYDROCHLORIDE 10 ML: 10 INJECTION, SOLUTION EPIDURAL; INFILTRATION; INTRACAUDAL; PERINEURAL at 11:35

## 2024-06-25 RX ADMIN — BUPIVACAINE HYDROCHLORIDE 5 ML: 5 INJECTION, SOLUTION EPIDURAL; INTRACAUDAL; PERINEURAL at 11:32

## 2024-06-25 RX ADMIN — TRIAMCINOLONE ACETONIDE 40 MG: 40 INJECTION, SUSPENSION INTRA-ARTICULAR; INTRAMUSCULAR at 11:35

## 2024-06-25 RX ADMIN — IOHEXOL 3 ML: 180 INJECTION INTRAVENOUS at 11:34
